# Patient Record
Sex: FEMALE | Race: WHITE | NOT HISPANIC OR LATINO | Employment: FULL TIME | URBAN - METROPOLITAN AREA
[De-identification: names, ages, dates, MRNs, and addresses within clinical notes are randomized per-mention and may not be internally consistent; named-entity substitution may affect disease eponyms.]

---

## 2017-01-30 ENCOUNTER — LAB (OUTPATIENT)
Dept: LAB | Facility: HOSPITAL | Age: 57
End: 2017-01-30

## 2017-01-30 ENCOUNTER — TRANSCRIBE ORDERS (OUTPATIENT)
Dept: ADMINISTRATIVE | Facility: HOSPITAL | Age: 57
End: 2017-01-30

## 2017-01-30 DIAGNOSIS — E11.9 DIABETES MELLITUS WITHOUT COMPLICATION (HCC): ICD-10-CM

## 2017-01-30 DIAGNOSIS — E55.9 UNSPECIFIED VITAMIN D DEFICIENCY: ICD-10-CM

## 2017-01-30 DIAGNOSIS — E03.9 UNSPECIFIED HYPOTHYROIDISM: ICD-10-CM

## 2017-01-30 DIAGNOSIS — I10 ESSENTIAL HYPERTENSION, MALIGNANT: Primary | ICD-10-CM

## 2017-01-30 DIAGNOSIS — I10 ESSENTIAL HYPERTENSION, MALIGNANT: ICD-10-CM

## 2017-01-30 DIAGNOSIS — E78.00 PURE HYPERCHOLESTEROLEMIA: ICD-10-CM

## 2017-01-30 LAB
25(OH)D3 SERPL-MCNC: 40.3 NG/ML (ref 30–100)
ALBUMIN SERPL-MCNC: 4.4 G/DL (ref 3.5–5.2)
ALBUMIN/GLOB SERPL: 1.8 G/DL
ALP SERPL-CCNC: 55 U/L (ref 39–117)
ALT SERPL W P-5'-P-CCNC: 25 U/L (ref 1–33)
ANION GAP SERPL CALCULATED.3IONS-SCNC: 13.9 MMOL/L
AST SERPL-CCNC: 23 U/L (ref 1–32)
BILIRUB SERPL-MCNC: 0.6 MG/DL (ref 0.1–1.2)
BUN BLD-MCNC: 11 MG/DL (ref 6–20)
BUN/CREAT SERPL: 14.1 (ref 7–25)
CALCIUM SPEC-SCNC: 9.4 MG/DL (ref 8.6–10.5)
CHLORIDE SERPL-SCNC: 98 MMOL/L (ref 98–107)
CHOLEST SERPL-MCNC: 128 MG/DL (ref 0–200)
CO2 SERPL-SCNC: 28.1 MMOL/L (ref 22–29)
CREAT BLD-MCNC: 0.78 MG/DL (ref 0.57–1)
GFR SERPL CREATININE-BSD FRML MDRD: 76 ML/MIN/1.73
GLOBULIN UR ELPH-MCNC: 2.4 GM/DL
GLUCOSE BLD-MCNC: 115 MG/DL (ref 65–99)
HBA1C MFR BLD: 5.65 % (ref 4.8–5.6)
HDLC SERPL-MCNC: 57 MG/DL (ref 40–60)
LDLC SERPL CALC-MCNC: 54 MG/DL (ref 0–100)
LDLC/HDLC SERPL: 0.94 {RATIO}
POTASSIUM BLD-SCNC: 4.3 MMOL/L (ref 3.5–5.2)
PROT SERPL-MCNC: 6.8 G/DL (ref 6–8.5)
SODIUM BLD-SCNC: 140 MMOL/L (ref 136–145)
T4 FREE SERPL-MCNC: 1.01 NG/DL (ref 0.93–1.7)
T4 SERPL-MCNC: 7.43 MCG/DL (ref 4.5–11.7)
TRIGL SERPL-MCNC: 87 MG/DL (ref 0–150)
TSH SERPL DL<=0.05 MIU/L-ACNC: 2.17 MIU/ML (ref 0.27–4.2)
VLDLC SERPL-MCNC: 17.4 MG/DL (ref 5–40)

## 2017-01-30 PROCEDURE — 80061 LIPID PANEL: CPT | Performed by: INTERNAL MEDICINE

## 2017-01-30 PROCEDURE — 84439 ASSAY OF FREE THYROXINE: CPT | Performed by: INTERNAL MEDICINE

## 2017-01-30 PROCEDURE — 84443 ASSAY THYROID STIM HORMONE: CPT | Performed by: INTERNAL MEDICINE

## 2017-01-30 PROCEDURE — 80053 COMPREHEN METABOLIC PANEL: CPT | Performed by: INTERNAL MEDICINE

## 2017-01-30 PROCEDURE — 84436 ASSAY OF TOTAL THYROXINE: CPT | Performed by: INTERNAL MEDICINE

## 2017-01-30 PROCEDURE — 83036 HEMOGLOBIN GLYCOSYLATED A1C: CPT | Performed by: INTERNAL MEDICINE

## 2017-01-30 PROCEDURE — 36415 COLL VENOUS BLD VENIPUNCTURE: CPT

## 2017-01-30 PROCEDURE — 82306 VITAMIN D 25 HYDROXY: CPT | Performed by: INTERNAL MEDICINE

## 2017-06-26 ENCOUNTER — OFFICE VISIT (OUTPATIENT)
Dept: OBSTETRICS AND GYNECOLOGY | Age: 57
End: 2017-06-26

## 2017-06-26 VITALS
DIASTOLIC BLOOD PRESSURE: 78 MMHG | BODY MASS INDEX: 33.04 KG/M2 | WEIGHT: 175 LBS | SYSTOLIC BLOOD PRESSURE: 128 MMHG | HEIGHT: 61 IN

## 2017-06-26 DIAGNOSIS — Z78.0 MENOPAUSE: ICD-10-CM

## 2017-06-26 DIAGNOSIS — Z01.419 WELL WOMAN EXAM WITH ROUTINE GYNECOLOGICAL EXAM: Primary | ICD-10-CM

## 2017-06-26 LAB
BILIRUB BLD-MCNC: NEGATIVE MG/DL
GLUCOSE UR STRIP-MCNC: NEGATIVE MG/DL
KETONES UR QL: NEGATIVE
LEUKOCYTE EST, POC: NEGATIVE
NITRITE UR-MCNC: NEGATIVE MG/ML
PH UR: 6 [PH] (ref 5–8)
PROT UR STRIP-MCNC: NEGATIVE MG/DL
RBC # UR STRIP: NEGATIVE /UL
SP GR UR: 1.03 (ref 1–1.03)
UROBILINOGEN UR QL: NORMAL

## 2017-06-26 PROCEDURE — 81003 URINALYSIS AUTO W/O SCOPE: CPT | Performed by: OBSTETRICS & GYNECOLOGY

## 2017-06-26 PROCEDURE — 99396 PREV VISIT EST AGE 40-64: CPT | Performed by: OBSTETRICS & GYNECOLOGY

## 2017-06-26 RX ORDER — FLUOXETINE HCL 10 MG
CAPSULE ORAL
Refills: 3 | COMMUNITY
Start: 2017-03-23 | End: 2017-06-26 | Stop reason: DRUGHIGH

## 2017-06-26 RX ORDER — FLUOXETINE HYDROCHLORIDE 20 MG/1
CAPSULE ORAL DAILY
COMMUNITY
Start: 2017-06-17 | End: 2018-07-02 | Stop reason: DRUGHIGH

## 2017-06-26 NOTE — PROGRESS NOTES
ANNUAL GYN EXAM        2017    Subjective     Abigail Hines is a 57 y.o., , White Female.    Chief Complaint   Patient presents with   • Gynecologic Exam     Abigail is here for her Annual Exam and Mammogram. No gyn complaints.  Her last colonoscopy was two years ago, she repeats every five years due to hx of colon polyps.  She sees Dr. Hogan.        History of Present Illness:     Gyn Complaints:   None     1.Menstrual Hx:  Postmenopausal, No HRT, she tried estring but did not like    2.Pap Smear Hx: Has never had an abnormal Pap smear.  2016, negative Pap smear, negative HR-HPV.    3.Breast / Ovarian Hx:  Regular SBE.       Family history of breast cancer: Mother, Maternal Aunt, Paternal Aunt     Family history of ovarian cancer: None    4.Family Hx of Colon Ca: None, Abigail has history of colon polyps, her last colonoscopy two years ago, ,, normal and repeat in five years.  Dr. Hogan      Family Hx of Uterine Ca: None    5. New Past Medical History: None    Past Medical History:   Diagnosis Date   • Breast nodule     Had calcium deposits on her first MG   • Diabetes mellitus     Type 2 DM   • Hyperlipidemia     Never had high cholesterol, started simvastatin because of Diabetes.   • Kidney stones     Intermittantly for the last 10 years, but none for last couple of years.   • Menopausal state    • Retinal detachment     Twice, 2008 & .        6. New Past Surgical History: None   Past Surgical History:   Procedure Laterality Date   •  SECTION      CPD, Macrosomic.   • CHOLECYSTECTOMY      Non functional GB.   • EXPLORATORY LAPAROTOMY     • TUBAL ABDOMINAL LIGATION          7. New Family Medical History: None   Family History   Problem Relation Age of Onset   • Breast cancer Mother 55     X2 ( 2 SEPARATE CANCERS).   • Dementia Father      RAPID ONSET AND VERY PROGRESSIVE.   • No Known Problems Sister    • No Known Problems Brother    • Anxiety disorder Son   "  • Osteoporosis Maternal Grandmother    • Breast cancer Paternal Grandmother 90       OF OLD AGE   • Osteoporosis Maternal Aunt    • Breast cancer Maternal Aunt 55   • No Known Problems Paternal Aunt    • Heart disease Maternal Grandfather    • Lung cancer Paternal Grandfather    • BRCA 1/2 Neg Hx    • Colon cancer Neg Hx    • Endometrial cancer Neg Hx    • Ovarian cancer Neg Hx          8.   OB History    Para Term  AB SAB TAB Ectopic Multiple Living   1 1 1       1      # Outcome Date GA Lbr Chilo/2nd Weight Sex Delivery Anes PTL Lv   1 Term                    9.   Health Maintenance   Topic Date Due   • PNEUMOCOCCAL VACCINE (19-64 MEDIUM RISK) (1 of 1 - PPSV23) 1977   • TDAP/TD VACCINES (1 - Tdap) 1977   • HEPATITIS C SCREENING  2017   • MAMMOGRAM  2017   • COLONOSCOPY  2017   • PAP SMEAR  2017   • INFLUENZA VACCINE  2017   • LIPID PANEL  2018       The following portions of the patient's history were reviewed / updated as appropriate: allergies, current medications, past medical history, past surgical history, past family history, past social history, and problem list.    Review of Systems   Constitutional: Negative.    HENT: Negative.    Eyes: Positive for itching.        Dry eyes   Respiratory: Negative.    Gastrointestinal: Diarrhea: SINSE CHANGED rx.   Endocrine: Negative.    Genitourinary: Negative.    Musculoskeletal: Negative.    Skin: Negative.    Allergic/Immunologic: Negative.    Neurological: Negative.    Hematological: Negative.    Psychiatric/Behavioral: Negative.    All other systems reviewed and are negative.      Objective     /78  Ht 61\" (154.9 cm)  Wt 175 lb (79.4 kg)  BMI 33.07 kg/m2    PHYSICAL EXAM    Constitutional: Well-developed, well-nourished,Obese white female, in no distress, alert and well oriented ×3.    Skin is warm, dry, without rash.       Neck appears normal, trachea in the midline.  Thyroid exam normal.  " No carotid bruits bilaterally.         No cervical lymphadenopathy.    Lungs clear to auscultation.  Chest wall appears normal.    Heart with regular rhythm without murmur or gallop.    Breasts:         Right breast nontender, without dominant mass.  No nipple discharge.            No superficial skin changes.  No axillary adenopathy.        Left breast nontender, without dominant mass.  No nipple discharge.            No superficial skin changes.  No axillary adenopathy.      Abdomen is flat, soft and nontender.No palpable mass.           No hepatosplenomegaly.  Flanks are negative.          Bowel sounds normal.  No abdominal bruit.          No inguinal lymphadenopathy bilaterally.     Pelvic exam :  Vulva normal.           External genitalia normal.  BUS negative.             Introitus normal.  Vaginal mucosa atrophic            Cervix normal, atrophic, no Pap smear, no cervical motion tenderness.          Uterus midplane, normal size, normal shape, and position.          Adnexa are negative bilaterally.  No tenderness.  No palpable mass.          Rectovaginal exam deferred by patient request.    Musc /Skel: Lower extremities without edema.     Neuro: Coordination is grossly normal.  Gait is normal.    Psych: Mood and affect is normal.  Behavior normal.  Thought content normal.            Judgment normal.        Assessment/Plan   Abigail was seen today for gynecologic exam.    Diagnoses and all orders for this visit:    Well woman exam with routine gynecological exam  -     POC Urinalysis Dipstick, Automated    No HRT  Comments:  No HRT      COMMENTS: Had colon polyps, in 2015.  Repeat every 5 years.    Agustín Jacob MD

## 2017-07-31 ENCOUNTER — LAB (OUTPATIENT)
Dept: LAB | Facility: HOSPITAL | Age: 57
End: 2017-07-31

## 2017-07-31 ENCOUNTER — TRANSCRIBE ORDERS (OUTPATIENT)
Dept: ADMINISTRATIVE | Facility: HOSPITAL | Age: 57
End: 2017-07-31

## 2017-07-31 DIAGNOSIS — E11.9 DIABETES MELLITUS WITHOUT COMPLICATION (HCC): ICD-10-CM

## 2017-07-31 DIAGNOSIS — E03.9 UNSPECIFIED HYPOTHYROIDISM: ICD-10-CM

## 2017-07-31 DIAGNOSIS — I10 ESSENTIAL HYPERTENSION, MALIGNANT: ICD-10-CM

## 2017-07-31 DIAGNOSIS — E78.5 HYPERLIPIDEMIA, UNSPECIFIED HYPERLIPIDEMIA TYPE: ICD-10-CM

## 2017-07-31 DIAGNOSIS — I10 ESSENTIAL HYPERTENSION, MALIGNANT: Primary | ICD-10-CM

## 2017-07-31 LAB
ALBUMIN SERPL-MCNC: 4.3 G/DL (ref 3.5–5.2)
ALBUMIN/GLOB SERPL: 1.7 G/DL
ALP SERPL-CCNC: 52 U/L (ref 39–117)
ALT SERPL W P-5'-P-CCNC: 36 U/L (ref 1–33)
ANION GAP SERPL CALCULATED.3IONS-SCNC: 11.1 MMOL/L
AST SERPL-CCNC: 28 U/L (ref 1–32)
BILIRUB SERPL-MCNC: 0.7 MG/DL (ref 0.1–1.2)
BUN BLD-MCNC: 11 MG/DL (ref 6–20)
BUN/CREAT SERPL: 12.9 (ref 7–25)
CALCIUM SPEC-SCNC: 9.9 MG/DL (ref 8.6–10.5)
CHLORIDE SERPL-SCNC: 101 MMOL/L (ref 98–107)
CHOLEST SERPL-MCNC: 126 MG/DL (ref 0–200)
CO2 SERPL-SCNC: 27.9 MMOL/L (ref 22–29)
CREAT BLD-MCNC: 0.85 MG/DL (ref 0.57–1)
GFR SERPL CREATININE-BSD FRML MDRD: 68 ML/MIN/1.73
GLOBULIN UR ELPH-MCNC: 2.6 GM/DL
GLUCOSE BLD-MCNC: 124 MG/DL (ref 65–99)
HBA1C MFR BLD: 6.3 % (ref 4.8–5.6)
HDLC SERPL-MCNC: 51 MG/DL (ref 40–60)
LDLC SERPL CALC-MCNC: 54 MG/DL (ref 0–100)
LDLC/HDLC SERPL: 1.07 {RATIO}
POTASSIUM BLD-SCNC: 4.4 MMOL/L (ref 3.5–5.2)
PROT SERPL-MCNC: 6.9 G/DL (ref 6–8.5)
SODIUM BLD-SCNC: 140 MMOL/L (ref 136–145)
TRIGL SERPL-MCNC: 103 MG/DL (ref 0–150)
TSH SERPL DL<=0.05 MIU/L-ACNC: 2.84 MIU/ML (ref 0.27–4.2)
VLDLC SERPL-MCNC: 20.6 MG/DL (ref 5–40)

## 2017-07-31 PROCEDURE — 80061 LIPID PANEL: CPT | Performed by: INTERNAL MEDICINE

## 2017-07-31 PROCEDURE — 36415 COLL VENOUS BLD VENIPUNCTURE: CPT

## 2017-07-31 PROCEDURE — 83036 HEMOGLOBIN GLYCOSYLATED A1C: CPT | Performed by: INTERNAL MEDICINE

## 2017-07-31 PROCEDURE — 80053 COMPREHEN METABOLIC PANEL: CPT | Performed by: INTERNAL MEDICINE

## 2017-07-31 PROCEDURE — 84443 ASSAY THYROID STIM HORMONE: CPT | Performed by: INTERNAL MEDICINE

## 2017-08-31 ENCOUNTER — HOSPITAL ENCOUNTER (OUTPATIENT)
Dept: GENERAL RADIOLOGY | Facility: HOSPITAL | Age: 57
Discharge: HOME | End: 2017-08-31
Admitting: INTERNAL MEDICINE

## 2017-08-31 ENCOUNTER — TRANSCRIBE ORDERS (OUTPATIENT)
Dept: ADMINISTRATIVE | Facility: HOSPITAL | Age: 57
End: 2017-08-31

## 2017-08-31 DIAGNOSIS — M79.672 LEFT FOOT PAIN: Primary | ICD-10-CM

## 2017-08-31 DIAGNOSIS — M79.672 LEFT FOOT PAIN: ICD-10-CM

## 2017-08-31 PROCEDURE — 73630 X-RAY EXAM OF FOOT: CPT

## 2017-09-01 ENCOUNTER — TRANSCRIBE ORDERS (OUTPATIENT)
Dept: ADMINISTRATIVE | Facility: HOSPITAL | Age: 57
End: 2017-09-01

## 2017-09-01 DIAGNOSIS — M84.30XA STRESS FRACTURE, UNSPECIFIED SITE, INITIAL ENCOUNTER: Primary | ICD-10-CM

## 2017-09-06 ENCOUNTER — APPOINTMENT (OUTPATIENT)
Dept: NUCLEAR MEDICINE | Facility: HOSPITAL | Age: 57
End: 2017-09-06

## 2017-12-04 ENCOUNTER — TRANSCRIBE ORDERS (OUTPATIENT)
Dept: ADMINISTRATIVE | Facility: HOSPITAL | Age: 57
End: 2017-12-04

## 2017-12-04 ENCOUNTER — LAB (OUTPATIENT)
Dept: LAB | Facility: HOSPITAL | Age: 57
End: 2017-12-04

## 2017-12-04 DIAGNOSIS — E11.9 DIABETES MELLITUS WITHOUT COMPLICATION (HCC): ICD-10-CM

## 2017-12-04 DIAGNOSIS — I10 ESSENTIAL HYPERTENSION, MALIGNANT: Primary | ICD-10-CM

## 2017-12-04 DIAGNOSIS — E78.5 HYPERLIPIDEMIA, UNSPECIFIED HYPERLIPIDEMIA TYPE: ICD-10-CM

## 2017-12-04 DIAGNOSIS — I10 ESSENTIAL HYPERTENSION, MALIGNANT: ICD-10-CM

## 2017-12-04 LAB
ALBUMIN SERPL-MCNC: 4.2 G/DL (ref 3.5–5.2)
ALBUMIN/GLOB SERPL: 1.7 G/DL
ALP SERPL-CCNC: 63 U/L (ref 39–117)
ALT SERPL W P-5'-P-CCNC: 36 U/L (ref 1–33)
ANION GAP SERPL CALCULATED.3IONS-SCNC: 11.4 MMOL/L
AST SERPL-CCNC: 26 U/L (ref 1–32)
BILIRUB SERPL-MCNC: 0.7 MG/DL (ref 0.1–1.2)
BUN BLD-MCNC: 10 MG/DL (ref 6–20)
BUN/CREAT SERPL: 12.3 (ref 7–25)
CALCIUM SPEC-SCNC: 9.9 MG/DL (ref 8.6–10.5)
CHLORIDE SERPL-SCNC: 99 MMOL/L (ref 98–107)
CHOLEST SERPL-MCNC: 125 MG/DL (ref 0–200)
CO2 SERPL-SCNC: 27.6 MMOL/L (ref 22–29)
CREAT BLD-MCNC: 0.81 MG/DL (ref 0.57–1)
GFR SERPL CREATININE-BSD FRML MDRD: 72 ML/MIN/1.73
GLOBULIN UR ELPH-MCNC: 2.5 GM/DL
GLUCOSE BLD-MCNC: 128 MG/DL (ref 65–99)
HBA1C MFR BLD: 6.7 % (ref 4.8–5.6)
HDLC SERPL-MCNC: 51 MG/DL (ref 40–60)
LDLC SERPL CALC-MCNC: 53 MG/DL (ref 0–100)
LDLC/HDLC SERPL: 1.04 {RATIO}
POTASSIUM BLD-SCNC: 4.2 MMOL/L (ref 3.5–5.2)
PROT SERPL-MCNC: 6.7 G/DL (ref 6–8.5)
SODIUM BLD-SCNC: 138 MMOL/L (ref 136–145)
TRIGL SERPL-MCNC: 106 MG/DL (ref 0–150)
VLDLC SERPL-MCNC: 21.2 MG/DL (ref 5–40)

## 2017-12-04 PROCEDURE — 80061 LIPID PANEL: CPT | Performed by: INTERNAL MEDICINE

## 2017-12-04 PROCEDURE — 80053 COMPREHEN METABOLIC PANEL: CPT | Performed by: INTERNAL MEDICINE

## 2017-12-04 PROCEDURE — 36415 COLL VENOUS BLD VENIPUNCTURE: CPT

## 2017-12-04 PROCEDURE — 83036 HEMOGLOBIN GLYCOSYLATED A1C: CPT | Performed by: INTERNAL MEDICINE

## 2017-12-05 ENCOUNTER — TRANSCRIBE ORDERS (OUTPATIENT)
Dept: ADMINISTRATIVE | Facility: HOSPITAL | Age: 57
End: 2017-12-05

## 2017-12-05 DIAGNOSIS — S92.902S FOOT FRACTURE, LEFT, SEQUELA: Primary | ICD-10-CM

## 2017-12-07 ENCOUNTER — HOSPITAL ENCOUNTER (OUTPATIENT)
Dept: NUCLEAR MEDICINE | Facility: HOSPITAL | Age: 57
Discharge: HOME | End: 2017-12-07

## 2017-12-07 DIAGNOSIS — S92.902S FOOT FRACTURE, LEFT, SEQUELA: ICD-10-CM

## 2017-12-07 PROCEDURE — A9503 TC99M MEDRONATE: HCPCS

## 2017-12-07 PROCEDURE — 0 TECHNETIUM MEDRONATE KIT

## 2017-12-07 PROCEDURE — 78300 BONE IMAGING LIMITED AREA: CPT

## 2017-12-07 RX ORDER — TC 99M MEDRONATE 20 MG/10ML
21.3 INJECTION, POWDER, LYOPHILIZED, FOR SOLUTION INTRAVENOUS
Status: DISCONTINUED | OUTPATIENT
Start: 2017-12-07 | End: 2017-12-08 | Stop reason: HOSPADM

## 2018-04-09 ENCOUNTER — LAB (OUTPATIENT)
Dept: LAB | Facility: HOSPITAL | Age: 58
End: 2018-04-09

## 2018-04-09 ENCOUNTER — TRANSCRIBE ORDERS (OUTPATIENT)
Dept: ADMINISTRATIVE | Facility: HOSPITAL | Age: 58
End: 2018-04-09

## 2018-04-09 DIAGNOSIS — E78.5 HYPERLIPIDEMIA, UNSPECIFIED HYPERLIPIDEMIA TYPE: ICD-10-CM

## 2018-04-09 DIAGNOSIS — I10 ESSENTIAL HYPERTENSION, MALIGNANT: Primary | ICD-10-CM

## 2018-04-09 DIAGNOSIS — I51.9 MYXEDEMA HEART DISEASE: ICD-10-CM

## 2018-04-09 DIAGNOSIS — E03.9 MYXEDEMA HEART DISEASE: ICD-10-CM

## 2018-04-09 DIAGNOSIS — E11.9 DIABETES MELLITUS WITHOUT COMPLICATION (HCC): ICD-10-CM

## 2018-04-09 DIAGNOSIS — I10 ESSENTIAL HYPERTENSION, MALIGNANT: ICD-10-CM

## 2018-04-09 LAB
ALBUMIN SERPL-MCNC: 4.3 G/DL (ref 3.5–5.2)
ALBUMIN/GLOB SERPL: 1.6 G/DL
ALP SERPL-CCNC: 56 U/L (ref 39–117)
ALT SERPL W P-5'-P-CCNC: 30 U/L (ref 1–33)
ANION GAP SERPL CALCULATED.3IONS-SCNC: 12.2 MMOL/L
AST SERPL-CCNC: 25 U/L (ref 1–32)
BILIRUB SERPL-MCNC: 0.7 MG/DL (ref 0.1–1.2)
BUN BLD-MCNC: 12 MG/DL (ref 8–23)
BUN/CREAT SERPL: 14.1 (ref 7–25)
CALCIUM SPEC-SCNC: 9.9 MG/DL (ref 8.6–10.5)
CHLORIDE SERPL-SCNC: 96 MMOL/L (ref 98–107)
CHOLEST SERPL-MCNC: 142 MG/DL (ref 0–200)
CO2 SERPL-SCNC: 28.8 MMOL/L (ref 22–29)
CREAT BLD-MCNC: 0.85 MG/DL (ref 0.57–1)
GFR SERPL CREATININE-BSD FRML MDRD: 68 ML/MIN/1.73
GLOBULIN UR ELPH-MCNC: 2.7 GM/DL
GLUCOSE BLD-MCNC: 121 MG/DL (ref 65–99)
HBA1C MFR BLD: 6.3 % (ref 4.8–5.6)
HDLC SERPL-MCNC: 49 MG/DL (ref 40–60)
LDLC SERPL CALC-MCNC: 71 MG/DL (ref 0–100)
LDLC/HDLC SERPL: 1.46 {RATIO}
POTASSIUM BLD-SCNC: 4.1 MMOL/L (ref 3.5–5.2)
PROT SERPL-MCNC: 7 G/DL (ref 6–8.5)
SODIUM BLD-SCNC: 137 MMOL/L (ref 136–145)
TRIGL SERPL-MCNC: 108 MG/DL (ref 0–150)
TSH SERPL DL<=0.05 MIU/L-ACNC: 2.31 MIU/ML (ref 0.27–4.2)
VLDLC SERPL-MCNC: 21.6 MG/DL (ref 5–40)

## 2018-04-09 PROCEDURE — 80053 COMPREHEN METABOLIC PANEL: CPT | Performed by: INTERNAL MEDICINE

## 2018-04-09 PROCEDURE — 84443 ASSAY THYROID STIM HORMONE: CPT | Performed by: INTERNAL MEDICINE

## 2018-04-09 PROCEDURE — 80061 LIPID PANEL: CPT | Performed by: INTERNAL MEDICINE

## 2018-04-09 PROCEDURE — 83036 HEMOGLOBIN GLYCOSYLATED A1C: CPT | Performed by: INTERNAL MEDICINE

## 2018-04-09 PROCEDURE — 36415 COLL VENOUS BLD VENIPUNCTURE: CPT

## 2018-07-02 ENCOUNTER — OFFICE VISIT (OUTPATIENT)
Dept: OBSTETRICS AND GYNECOLOGY | Age: 58
End: 2018-07-02

## 2018-07-02 ENCOUNTER — APPOINTMENT (OUTPATIENT)
Dept: WOMENS IMAGING | Facility: HOSPITAL | Age: 58
End: 2018-07-02

## 2018-07-02 VITALS
WEIGHT: 178 LBS | SYSTOLIC BLOOD PRESSURE: 138 MMHG | DIASTOLIC BLOOD PRESSURE: 64 MMHG | HEIGHT: 61 IN | BODY MASS INDEX: 33.61 KG/M2

## 2018-07-02 DIAGNOSIS — Z78.0 MENOPAUSE: ICD-10-CM

## 2018-07-02 DIAGNOSIS — Z11.51 SCREENING FOR HUMAN PAPILLOMAVIRUS: ICD-10-CM

## 2018-07-02 DIAGNOSIS — Z12.31 VISIT FOR SCREENING MAMMOGRAM: ICD-10-CM

## 2018-07-02 DIAGNOSIS — Z01.419 WELL WOMAN EXAM WITH ROUTINE GYNECOLOGICAL EXAM: Primary | ICD-10-CM

## 2018-07-02 LAB
BILIRUB BLD-MCNC: NEGATIVE MG/DL
CLARITY, POC: CLEAR
COLOR UR: YELLOW
GLUCOSE UR STRIP-MCNC: NEGATIVE MG/DL
KETONES UR QL: NEGATIVE
LEUKOCYTE EST, POC: NEGATIVE
NITRITE UR-MCNC: NEGATIVE MG/ML
PH UR: 6 [PH] (ref 5–8)
PROT UR STRIP-MCNC: NEGATIVE MG/DL
RBC # UR STRIP: ABNORMAL /UL
SP GR UR: 1.01 (ref 1–1.03)
UROBILINOGEN UR QL: NORMAL

## 2018-07-02 PROCEDURE — 77067 SCR MAMMO BI INCL CAD: CPT | Performed by: RADIOLOGY

## 2018-07-02 PROCEDURE — 81002 URINALYSIS NONAUTO W/O SCOPE: CPT | Performed by: OBSTETRICS & GYNECOLOGY

## 2018-07-02 PROCEDURE — 99396 PREV VISIT EST AGE 40-64: CPT | Performed by: OBSTETRICS & GYNECOLOGY

## 2018-07-02 RX ORDER — PIOGLITAZONEHYDROCHLORIDE 45 MG/1
45 TABLET ORAL DAILY
COMMUNITY
Start: 2018-05-20 | End: 2020-08-17

## 2018-07-02 RX ORDER — METFORMIN HYDROCHLORIDE 500 MG/1
500 TABLET, EXTENDED RELEASE ORAL 2 TIMES DAILY
COMMUNITY
Start: 2018-05-20 | End: 2020-08-17

## 2018-07-02 NOTE — PROGRESS NOTES
Routine Annual Visit    2018    Patient: Abigail Hines          MR#:[id number]    Chief Complaint   Patient presents with   • Gynecologic Exam     ABIGAIL IS HERE FOR HER AE AND MG.   LAST PAP SMEAR , NEG, NEG HR-HPV. LAST COLONOSCOPY , HX OF COLON POLYPS. COLONOSCOPY DUE THIS YEAR.        58 y.o. female  who presents for annual exam.     HISTORY OF PRESENT ILLNESS:    GYN Complaints:  Tried new lubricant and experienced increased vaginal discharge, no odor.  No itching or irritation.     No bladder problems.     Has lost some weight.    Other Complaints: None.    GYN HISTORY:  1.  Menstrual Hx:  PM      HRT:  no         Current contraception: post menopausal status    2.  History of abnormal Pap smear: no.        Pap smear Hx:  [id number], normal yearly Paps.                                    , neg pap smear, neg HR-HPV.     NEW PAST MEDICAL HISTORY:    3.  New Medical Hx:   Started new dose Metformin and increase in Pioglitazone Tabs; BS well controlled, HgbA1c = 6.3. Working on weight loss.  Past Medical History:   Diagnosis Date   • Breast nodule     Had calcium deposits on her first MG   • Diabetes mellitus     Type 2 DM   • Hyperlipidemia     Never had high cholesterol, started simvastatin because of Diabetes.   • Kidney stones     Intermittantly for the last 10 years, but none for last couple of years.   • Menopausal state    • Retinal detachment     Twice,  & .           4.  New Surgical Hx:  None.  Past Surgical History:   Procedure Laterality Date   •  SECTION      CPD, Macrosomic.   • CHOLECYSTECTOMY      Non functional GB.   • COLONOSCOPY      Dr. Hogan  normal repeat in five years (hx of colon polyps).  Verify dates   • EXPLORATORY LAPAROTOMY     • TUBAL ABDOMINAL LIGATION             5.  New Family Medical Hx:  None.  Family History   Problem Relation Age of Onset   • Breast cancer Mother 55        X2 ( 2 SEPARATE CANCERS).   •  "Dementia Father         RAPID ONSET AND VERY PROGRESSIVE.   • No Known Problems Sister    • No Known Problems Brother    • Anxiety disorder Son    • Osteoporosis Maternal Grandmother    • Breast cancer Paternal Grandmother 90          OF OLD AGE   • Osteoporosis Maternal Aunt    • Breast cancer Maternal Aunt 55   • Breast cancer Paternal Aunt    • Heart disease Maternal Grandfather    • Lung cancer Paternal Grandfather    • BRCA 1/2 Neg Hx    • Colon cancer Neg Hx    • Endometrial cancer Neg Hx    • Ovarian cancer Neg Hx          6.  SCREENINGS:  =Family history of breast cancer: yes - Mother, Maternal Aunt, Paternal Aunt  Perform regular self breast exam: yes - most months  Breast self-examination technique  reviewed and the patient encouraged to perform self breast exams monthly.     =Family history of ovarian cancer: no  =Family history of uterine cancer: no  =Family History of colon cancer: no, personal hx of colon polyps. Colonoscopy q 5 yr, Dr Hogan.      Mammogram: done today.  Colonoscopy: Due this year.  , normal repeat in five years (Dr. Hogan) hx of colon polyps.   DEXA: up to date. , Very dense at last check. Lumbar T score= +1.5, right femur, -0.2, left femur +0.1.    7.  LIFESTYLE:  =Diet: Working on weight loss..   = We discussed healthy lifestyle modifications.      =Exercise:  yes - two times per week.   =I recommended 30 minutes of aerobic exercise 5 times a week.     =Calcium  no.  =Vitamin D:  yes - 2000 IU / day..   = We discussed calcium intake needs to help prevent osteoporosis:      Recommended 600 mg of calcium daily and 1000 IUs of vitamin D 3 daily.      Review of Systems   Genitourinary: Positive for vaginal discharge.   All other systems reviewed and are negative.      Objective     /64   Ht 154.9 cm (61\")   Wt 80.7 kg (178 lb)   Breastfeeding? No   BMI 33.63 kg/m²     PHYSICAL EXAM    Constitutional: Well-developed, well-nourished, Obese  female, in no " distress, alert and well oriented ×3.    Skin is warm, dry, without rash.       Neck appears normal, trachea in the midline.  Thyroid exam normal.  No carotid bruits bilaterally.         No cervical lymphadenopathy.    Lungs clear to auscultation.  Chest wall appears normal.    Heart with regular rhythm without murmur or gallop.    Breasts:         Right breast nontender, without dominant mass.  No nipple discharge.            No superficial skin changes.  No axillary adenopathy.        Left breast nontender, without dominant mass.  No nipple discharge.            No superficial skin changes.  No axillary adenopathy.      Abdomen is flat, soft and nontender.No palpable mass.           No hepatosplenomegaly.  Flanks are negative.          Bowel sounds normal.  No abdominal bruit.          No inguinal lymphadenopathy bilaterally.     Pelvic exam :  Vulva normal.           External genitalia normal.  BUS negative.  No erythema or lesions.           Introitus normal.  Vaginal mucosa normal.  Normal-appearing discharge.          Cervix normal, very small os, Pap with HPV.  No cervical motion tenderness.          Uterus plain, normal size, normal shape, and position.          Adnexa are negative bilaterally.  No tenderness.  No palpable mass.          Rectovaginal exam negative .  Stool heme negative.    Musc /Skel: Lower extremities without edema.     Neuro: Coordination is grossly normal.  Gait is normal.    Psych: Mood and affect is normal.  Behavior normal.  Thought content normal.            Judgment normal.       =All questions were answered.      Assessment/Plan   Abigail was seen today for gynecologic exam.    Diagnoses and all orders for this visit:    Well woman exam with routine gynecological exam  -     POC Urinalysis Dipstick  -     PapIG, HPV, Rfx 16 / 18    Menopause  Comments:  No HRT.  Still sexually active, no problems.    Screening for human papillomavirus  -     PapIG, HPV, Rfx 16 /  18        COMMENTS:    Agustín Jacob MD  7/2/2018

## 2018-07-04 LAB
CYTOLOGIST CVX/VAG CYTO: NORMAL
CYTOLOGY CVX/VAG DOC THIN PREP: NORMAL
DX ICD CODE: NORMAL
HIV 1 & 2 AB SER-IMP: NORMAL
HPV I/H RISK 1 DNA CVX QL PROBE+SIG AMP: NEGATIVE
OTHER STN SPEC: NORMAL
PATH REPORT.FINAL DX SPEC: NORMAL
STAT OF ADQ CVX/VAG CYTO-IMP: NORMAL

## 2018-09-17 ENCOUNTER — LAB (OUTPATIENT)
Dept: LAB | Facility: HOSPITAL | Age: 58
End: 2018-09-17

## 2018-09-17 ENCOUNTER — TRANSCRIBE ORDERS (OUTPATIENT)
Dept: ADMINISTRATIVE | Facility: HOSPITAL | Age: 58
End: 2018-09-17

## 2018-09-17 DIAGNOSIS — E78.5 HYPERLIPIDEMIA, UNSPECIFIED HYPERLIPIDEMIA TYPE: ICD-10-CM

## 2018-09-17 DIAGNOSIS — E55.9 AVITAMINOSIS D: ICD-10-CM

## 2018-09-17 DIAGNOSIS — E11.9 DIABETES MELLITUS WITHOUT COMPLICATION (HCC): ICD-10-CM

## 2018-09-17 DIAGNOSIS — Z00.00 ROUTINE GENERAL MEDICAL EXAMINATION AT A HEALTH CARE FACILITY: ICD-10-CM

## 2018-09-17 DIAGNOSIS — I10 ESSENTIAL HYPERTENSION, MALIGNANT: ICD-10-CM

## 2018-09-17 DIAGNOSIS — Z00.00 ROUTINE GENERAL MEDICAL EXAMINATION AT A HEALTH CARE FACILITY: Primary | ICD-10-CM

## 2018-09-17 LAB
25(OH)D3 SERPL-MCNC: 73.2 NG/ML (ref 30–100)
ALBUMIN SERPL-MCNC: 4 G/DL (ref 3.5–5.2)
ALBUMIN/GLOB SERPL: 1.5 G/DL
ALP SERPL-CCNC: 55 U/L (ref 39–117)
ALT SERPL W P-5'-P-CCNC: 30 U/L (ref 1–33)
ANION GAP SERPL CALCULATED.3IONS-SCNC: 11 MMOL/L
AST SERPL-CCNC: 19 U/L (ref 1–32)
BASOPHILS # BLD AUTO: 0.05 10*3/MM3 (ref 0–0.2)
BASOPHILS NFR BLD AUTO: 0.9 % (ref 0–1.5)
BILIRUB SERPL-MCNC: 0.5 MG/DL (ref 0.1–1.2)
BILIRUB UR QL STRIP: NEGATIVE
BUN BLD-MCNC: 9 MG/DL (ref 8–23)
BUN/CREAT SERPL: 11.7 (ref 7–25)
CALCIUM SPEC-SCNC: 9.6 MG/DL (ref 8.6–10.5)
CHLORIDE SERPL-SCNC: 99 MMOL/L (ref 98–107)
CHOLEST SERPL-MCNC: 131 MG/DL (ref 0–200)
CLARITY UR: CLEAR
CO2 SERPL-SCNC: 28 MMOL/L (ref 22–29)
COLOR UR: YELLOW
CREAT BLD-MCNC: 0.77 MG/DL (ref 0.57–1)
DEPRECATED RDW RBC AUTO: 45.5 FL (ref 37–54)
EOSINOPHIL # BLD AUTO: 0.34 10*3/MM3 (ref 0–0.7)
EOSINOPHIL NFR BLD AUTO: 5.9 % (ref 0.3–6.2)
ERYTHROCYTE [DISTWIDTH] IN BLOOD BY AUTOMATED COUNT: 13 % (ref 11.7–13)
GFR SERPL CREATININE-BSD FRML MDRD: 76 ML/MIN/1.73
GLOBULIN UR ELPH-MCNC: 2.6 GM/DL
GLUCOSE BLD-MCNC: 108 MG/DL (ref 65–99)
GLUCOSE UR STRIP-MCNC: NEGATIVE MG/DL
HBA1C MFR BLD: 6.3 % (ref 4.8–5.6)
HCT VFR BLD AUTO: 39.8 % (ref 35.6–45.5)
HDLC SERPL-MCNC: 50 MG/DL (ref 40–60)
HGB BLD-MCNC: 13.1 G/DL (ref 11.9–15.5)
HGB UR QL STRIP.AUTO: NEGATIVE
IMM GRANULOCYTES # BLD: 0.01 10*3/MM3 (ref 0–0.03)
IMM GRANULOCYTES NFR BLD: 0.2 % (ref 0–0.5)
KETONES UR QL STRIP: NEGATIVE
LDLC SERPL CALC-MCNC: 53 MG/DL (ref 0–100)
LDLC/HDLC SERPL: 1.05 {RATIO}
LEUKOCYTE ESTERASE UR QL STRIP.AUTO: NEGATIVE
LYMPHOCYTES # BLD AUTO: 1.8 10*3/MM3 (ref 0.9–4.8)
LYMPHOCYTES NFR BLD AUTO: 31.3 % (ref 19.6–45.3)
MCH RBC QN AUTO: 31.8 PG (ref 26.9–32)
MCHC RBC AUTO-ENTMCNC: 32.9 G/DL (ref 32.4–36.3)
MCV RBC AUTO: 96.6 FL (ref 80.5–98.2)
MONOCYTES # BLD AUTO: 0.48 10*3/MM3 (ref 0.2–1.2)
MONOCYTES NFR BLD AUTO: 8.3 % (ref 5–12)
NEUTROPHILS # BLD AUTO: 3.08 10*3/MM3 (ref 1.9–8.1)
NEUTROPHILS NFR BLD AUTO: 53.6 % (ref 42.7–76)
NITRITE UR QL STRIP: NEGATIVE
PH UR STRIP.AUTO: 6.5 [PH] (ref 5–8)
PLATELET # BLD AUTO: 402 10*3/MM3 (ref 140–500)
PMV BLD AUTO: 10.2 FL (ref 6–12)
POTASSIUM BLD-SCNC: 4.2 MMOL/L (ref 3.5–5.2)
PROT SERPL-MCNC: 6.6 G/DL (ref 6–8.5)
PROT UR QL STRIP: NEGATIVE
RBC # BLD AUTO: 4.12 10*6/MM3 (ref 3.9–5.2)
SODIUM BLD-SCNC: 138 MMOL/L (ref 136–145)
SP GR UR STRIP: 1.01 (ref 1–1.03)
T3 SERPL-MCNC: 151.1 NG/DL (ref 80–200)
T4 FREE SERPL-MCNC: 1.2 NG/DL (ref 0.93–1.7)
TRIGL SERPL-MCNC: 142 MG/DL (ref 0–150)
TSH SERPL DL<=0.05 MIU/L-ACNC: 3.42 MIU/ML (ref 0.27–4.2)
UROBILINOGEN UR QL STRIP: NORMAL
VLDLC SERPL-MCNC: 28.4 MG/DL (ref 5–40)
WBC NRBC COR # BLD: 5.75 10*3/MM3 (ref 4.5–10.7)

## 2018-09-17 PROCEDURE — 82306 VITAMIN D 25 HYDROXY: CPT | Performed by: INTERNAL MEDICINE

## 2018-09-17 PROCEDURE — 84439 ASSAY OF FREE THYROXINE: CPT | Performed by: INTERNAL MEDICINE

## 2018-09-17 PROCEDURE — 81003 URINALYSIS AUTO W/O SCOPE: CPT | Performed by: INTERNAL MEDICINE

## 2018-09-17 PROCEDURE — 84480 ASSAY TRIIODOTHYRONINE (T3): CPT | Performed by: INTERNAL MEDICINE

## 2018-09-17 PROCEDURE — 85025 COMPLETE CBC W/AUTO DIFF WBC: CPT | Performed by: INTERNAL MEDICINE

## 2018-09-17 PROCEDURE — 80053 COMPREHEN METABOLIC PANEL: CPT | Performed by: INTERNAL MEDICINE

## 2018-09-17 PROCEDURE — 83036 HEMOGLOBIN GLYCOSYLATED A1C: CPT | Performed by: INTERNAL MEDICINE

## 2018-09-17 PROCEDURE — 80061 LIPID PANEL: CPT | Performed by: INTERNAL MEDICINE

## 2018-09-17 PROCEDURE — 36415 COLL VENOUS BLD VENIPUNCTURE: CPT

## 2018-09-17 PROCEDURE — 84443 ASSAY THYROID STIM HORMONE: CPT | Performed by: INTERNAL MEDICINE

## 2018-10-30 ENCOUNTER — APPOINTMENT (OUTPATIENT)
Dept: GENERAL RADIOLOGY | Facility: HOSPITAL | Age: 58
End: 2018-10-30

## 2018-10-30 ENCOUNTER — HOSPITAL ENCOUNTER (OUTPATIENT)
Dept: CARDIOLOGY | Facility: HOSPITAL | Age: 58
Discharge: HOME | End: 2018-10-30
Admitting: INTERNAL MEDICINE

## 2018-10-30 ENCOUNTER — HOSPITAL ENCOUNTER (OUTPATIENT)
Dept: CARDIOLOGY | Facility: HOSPITAL | Age: 58
Discharge: HOME | End: 2018-10-30
Attending: INTERNAL MEDICINE

## 2018-10-30 ENCOUNTER — HOSPITAL ENCOUNTER (EMERGENCY)
Facility: HOSPITAL | Age: 58
Discharge: HOME | End: 2018-10-30
Attending: EMERGENCY MEDICINE | Admitting: EMERGENCY MEDICINE

## 2018-10-30 VITALS
SYSTOLIC BLOOD PRESSURE: 130 MMHG | RESPIRATION RATE: 16 BRPM | DIASTOLIC BLOOD PRESSURE: 78 MMHG | OXYGEN SATURATION: 98 % | HEART RATE: 80 BPM

## 2018-10-30 VITALS
RESPIRATION RATE: 18 BRPM | SYSTOLIC BLOOD PRESSURE: 122 MMHG | WEIGHT: 185.5 LBS | HEIGHT: 61 IN | DIASTOLIC BLOOD PRESSURE: 67 MMHG | BODY MASS INDEX: 35.02 KG/M2 | TEMPERATURE: 98.9 F | HEART RATE: 63 BPM | OXYGEN SATURATION: 95 %

## 2018-10-30 DIAGNOSIS — R07.2 PRECORDIAL PAIN: ICD-10-CM

## 2018-10-30 DIAGNOSIS — R07.2 PRECORDIAL PAIN: Primary | ICD-10-CM

## 2018-10-30 DIAGNOSIS — R07.9 CHEST PAIN, UNSPECIFIED TYPE: Primary | ICD-10-CM

## 2018-10-30 LAB
ALBUMIN SERPL-MCNC: 4.3 G/DL (ref 3.5–5.2)
ALBUMIN/GLOB SERPL: 1.7 G/DL
ALP SERPL-CCNC: 60 U/L (ref 39–117)
ALT SERPL W P-5'-P-CCNC: 41 U/L (ref 1–33)
ANION GAP SERPL CALCULATED.3IONS-SCNC: 12 MMOL/L
AST SERPL-CCNC: 35 U/L (ref 1–32)
BASOPHILS # BLD AUTO: 0.04 10*3/MM3 (ref 0–0.2)
BASOPHILS NFR BLD AUTO: 0.4 % (ref 0–1.5)
BH CV NUCLEAR PRIOR STUDY: 2
BH CV STRESS BP STAGE 1: NORMAL
BH CV STRESS BP STAGE 2: NORMAL
BH CV STRESS DURATION MIN STAGE 1: 3
BH CV STRESS DURATION MIN STAGE 2: 3
BH CV STRESS DURATION SEC STAGE 1: 0
BH CV STRESS DURATION SEC STAGE 2: 0
BH CV STRESS DURATION SEC STAGE 3: 30
BH CV STRESS GRADE STAGE 1: 10
BH CV STRESS GRADE STAGE 2: 12
BH CV STRESS GRADE STAGE 3: 14
BH CV STRESS HR STAGE 1: 111
BH CV STRESS HR STAGE 2: 137
BH CV STRESS HR STAGE 3: 143
BH CV STRESS METS STAGE 1: 5
BH CV STRESS METS STAGE 2: 7.5
BH CV STRESS METS STAGE 3: 10
BH CV STRESS PROTOCOL 1: NORMAL
BH CV STRESS RECOVERY BP: NORMAL MMHG
BH CV STRESS RECOVERY HR: 93 BPM
BH CV STRESS SPEED STAGE 1: 1.7
BH CV STRESS SPEED STAGE 2: 2.5
BH CV STRESS SPEED STAGE 3: 3.4
BH CV STRESS STAGE 1: 1
BH CV STRESS STAGE 2: 2
BH CV STRESS STAGE 3: 3
BILIRUB SERPL-MCNC: 0.6 MG/DL (ref 0.1–1.2)
BUN BLD-MCNC: 7 MG/DL (ref 8–23)
BUN/CREAT SERPL: 8.4 (ref 7–25)
CALCIUM SPEC-SCNC: 9.8 MG/DL (ref 8.6–10.5)
CHLORIDE SERPL-SCNC: 97 MMOL/L (ref 98–107)
CO2 SERPL-SCNC: 28 MMOL/L (ref 22–29)
CREAT BLD-MCNC: 0.83 MG/DL (ref 0.57–1)
DEPRECATED RDW RBC AUTO: 43.5 FL (ref 37–54)
EOSINOPHIL # BLD AUTO: 0.12 10*3/MM3 (ref 0–0.7)
EOSINOPHIL NFR BLD AUTO: 1.2 % (ref 0.3–6.2)
ERYTHROCYTE [DISTWIDTH] IN BLOOD BY AUTOMATED COUNT: 12.6 % (ref 11.7–13)
GFR SERPL CREATININE-BSD FRML MDRD: 70 ML/MIN/1.73
GLOBULIN UR ELPH-MCNC: 2.5 GM/DL
GLUCOSE BLD-MCNC: 186 MG/DL (ref 65–99)
HCT VFR BLD AUTO: 42.7 % (ref 35.6–45.5)
HGB BLD-MCNC: 13.6 G/DL (ref 11.9–15.5)
IMM GRANULOCYTES # BLD: 0 10*3/MM3 (ref 0–0.03)
IMM GRANULOCYTES NFR BLD: 0 % (ref 0–0.5)
LV EF NUC BP: 79 %
LYMPHOCYTES # BLD AUTO: 1.25 10*3/MM3 (ref 0.9–4.8)
LYMPHOCYTES NFR BLD AUTO: 12 % (ref 19.6–45.3)
MAXIMAL PREDICTED HEART RATE: 160 BPM
MCH RBC QN AUTO: 30.3 PG (ref 26.9–32)
MCHC RBC AUTO-ENTMCNC: 31.9 G/DL (ref 32.4–36.3)
MCV RBC AUTO: 95.1 FL (ref 80.5–98.2)
MONOCYTES # BLD AUTO: 0.68 10*3/MM3 (ref 0.2–1.2)
MONOCYTES NFR BLD AUTO: 6.6 % (ref 5–12)
NEUTROPHILS # BLD AUTO: 8.29 10*3/MM3 (ref 1.9–8.1)
NEUTROPHILS NFR BLD AUTO: 79.8 % (ref 42.7–76)
NT-PROBNP SERPL-MCNC: 44.8 PG/ML (ref 5–900)
PERCENT MAX PREDICTED HR: 89.38 %
PLATELET # BLD AUTO: 393 10*3/MM3 (ref 140–500)
PMV BLD AUTO: 10.4 FL (ref 6–12)
POTASSIUM BLD-SCNC: 4 MMOL/L (ref 3.5–5.2)
PROT SERPL-MCNC: 6.8 G/DL (ref 6–8.5)
RBC # BLD AUTO: 4.49 10*6/MM3 (ref 3.9–5.2)
SODIUM BLD-SCNC: 137 MMOL/L (ref 136–145)
STRESS BASELINE BP: NORMAL MMHG
STRESS BASELINE HR: 85 BPM
STRESS PERCENT HR: 105 %
STRESS POST ESTIMATED WORKLOAD: 7 METS
STRESS POST EXERCISE DUR MIN: 6 MIN
STRESS POST EXERCISE DUR SEC: 30 SEC
STRESS POST PEAK BP: NORMAL MMHG
STRESS POST PEAK HR: 143 BPM
STRESS TARGET HR: 136 BPM
TROPONIN T SERPL-MCNC: <0.01 NG/ML (ref 0–0.03)
WBC NRBC COR # BLD: 10.38 10*3/MM3 (ref 4.5–10.7)

## 2018-10-30 PROCEDURE — 93018 CV STRESS TEST I&R ONLY: CPT | Performed by: INTERNAL MEDICINE

## 2018-10-30 PROCEDURE — 93010 ELECTROCARDIOGRAM REPORT: CPT | Performed by: INTERNAL MEDICINE

## 2018-10-30 PROCEDURE — 99203 OFFICE O/P NEW LOW 30 MIN: CPT | Performed by: INTERNAL MEDICINE

## 2018-10-30 PROCEDURE — 85025 COMPLETE CBC W/AUTO DIFF WBC: CPT | Performed by: EMERGENCY MEDICINE

## 2018-10-30 PROCEDURE — 0 TECHNETIUM TETROFOSMIN KIT: Performed by: INTERNAL MEDICINE

## 2018-10-30 PROCEDURE — 84484 ASSAY OF TROPONIN QUANT: CPT | Performed by: EMERGENCY MEDICINE

## 2018-10-30 PROCEDURE — 71046 X-RAY EXAM CHEST 2 VIEWS: CPT

## 2018-10-30 PROCEDURE — 78452 HT MUSCLE IMAGE SPECT MULT: CPT | Performed by: INTERNAL MEDICINE

## 2018-10-30 PROCEDURE — 99284 EMERGENCY DEPT VISIT MOD MDM: CPT

## 2018-10-30 PROCEDURE — 93017 CV STRESS TEST TRACING ONLY: CPT

## 2018-10-30 PROCEDURE — 93005 ELECTROCARDIOGRAM TRACING: CPT | Performed by: EMERGENCY MEDICINE

## 2018-10-30 PROCEDURE — 83880 ASSAY OF NATRIURETIC PEPTIDE: CPT | Performed by: EMERGENCY MEDICINE

## 2018-10-30 PROCEDURE — 94760 N-INVAS EAR/PLS OXIMETRY 1: CPT

## 2018-10-30 PROCEDURE — 80053 COMPREHEN METABOLIC PANEL: CPT | Performed by: EMERGENCY MEDICINE

## 2018-10-30 PROCEDURE — A9502 TC99M TETROFOSMIN: HCPCS | Performed by: INTERNAL MEDICINE

## 2018-10-30 PROCEDURE — 93016 CV STRESS TEST SUPVJ ONLY: CPT | Performed by: INTERNAL MEDICINE

## 2018-10-30 PROCEDURE — 78452 HT MUSCLE IMAGE SPECT MULT: CPT

## 2018-10-30 RX ORDER — FLUOXETINE HYDROCHLORIDE 20 MG/1
20 CAPSULE ORAL DAILY
COMMUNITY
End: 2019-08-05

## 2018-10-30 RX ADMIN — TETROFOSMIN 1 DOSE: 1.38 INJECTION, POWDER, LYOPHILIZED, FOR SOLUTION INTRAVENOUS at 14:20

## 2018-10-30 RX ADMIN — TETROFOSMIN 1 DOSE: 1.38 INJECTION, POWDER, LYOPHILIZED, FOR SOLUTION INTRAVENOUS at 15:20

## 2018-10-30 NOTE — PROGRESS NOTES
Subjective:     Encounter Date:10/30/2018      Patient ID: Abigail Hines is a 58 y.o. female.    Chief Complaint: CP  History of Present Illness    This patient presents to the [facility] from the ED after presenting with CP.    She woke up and had an epigastric and lower substernal discomfort.  This was about 5:30 or 545 when she woke up.  She did have some nausea.  She had recently been started on Prozac and thought that might be the explanation.  She laid there for a little while, but didn't feel any better.  She did not have a acid or bitterness in her mouth.  No diaphoresis.  She got up and moved around and that did not seem to affect the discomfort.  She then thought she should get this checked out so she called EMS.  EMS arrived and gave her sublingual nitroglycerin which may be made it a little bit better.  She was then brought to the emergency room at [facility].  By the time of her arrival she was pain-free.  Evaluation in the emergency room was unremarkable.  She had no further spells of chest pain or chest discomfort.  Troponin levels, other laboratory data, and EKG were unremarkable.  Telemetry monitoring did not demonstrate any pathology.  Chest x-ray did not show any abnormality.  They then contacted Dr. Conner and the patient was sent to the [facility] for evaluation and a stress test.    Prior to this she's been doing well without any cardiac symptoms.  No chest pain or chest discomfort.  No shortness breath.  No nausea or vomiting, and no diaphoresis.  She's been active without difficulty.This patient has no known cardiac history.  This patient has no history of coronary artery disease, congestive heart failure, rheumatic fever, rheumatic heart disease, congenital heart disease or heart murmur.  This patient has never required invasive cardiovascular evaluation.        The following portions of the patient's history were reviewed and updated as appropriate: allergies, current  medications, past family history, past medical history, past social history, past surgical history and problem list.    Past Medical History:   Diagnosis Date   • Breast nodule     Had calcium deposits on her first MG   • Diabetes mellitus (CMS/[facility])     Type 2 DM   • History of Papanicolaou smear of cervix      Never had an abnormal Pap smear.   Pap smear Hx:  [id number], normal yearly Paps.  , neg pap smear, neg HR-HPV.    • Hyperlipidemia     Never had high cholesterol, started simvastatin because of Diabetes.   • Hypertension    • Kidney stones     Intermittantly for the last 10 years, but none for last couple of years.   • Menopausal state    • Retinal detachment 2010    Twice, 2008 & 2010.       Past Surgical History:   Procedure Laterality Date   • CATARACT EXTRACTION, BILATERAL     •  SECTION      CPD, Macrosomic.   • CHOLECYSTECTOMY      Non functional GB.   • COLONOSCOPY      Dr. Hogan  normal repeat in five years (hx of colon polyps).  Verify dates   • EXPLORATORY LAPAROTOMY     • TUBAL ABDOMINAL LIGATION         Social History     Social History   • Marital status:      Spouse name: N/A   • Number of children: 1   • Years of education: N/A     Occupational History   • Not on file.     Social History Main Topics   • Smoking status: Never Smoker   • Smokeless tobacco: Never Used   • Alcohol use No   • Drug use: No   • Sexual activity: Yes     Partners: Male     Birth control/ protection: Surgical, Post-menopausal      Comment: Partner - JAYLEN      Other Topics Concern   • Not on file     Social History Narrative   • No narrative on file       Review of Systems   Constitution: Negative for chills, decreased appetite, fever and night sweats.   HENT: Negative for ear discharge, ear pain, hearing loss, nosebleeds and sore throat.    Eyes: Negative for blurred vision, double vision and pain.   Cardiovascular: Negative for cyanosis.   Respiratory: Negative for  hemoptysis and sputum production.    Endocrine: Negative for cold intolerance and heat intolerance.   Hematologic/Lymphatic: Negative for adenopathy.   Skin: Negative for dry skin, itching, nail changes, rash and suspicious lesions.   Musculoskeletal: Negative for arthritis, gout, muscle cramps, muscle weakness, myalgias and neck pain.   Gastrointestinal: Positive for abdominal pain. Negative for anorexia, bowel incontinence, constipation, diarrhea, dysphagia, hematemesis and jaundice.   Genitourinary: Negative for bladder incontinence, dysuria, flank pain, frequency, hematuria and nocturia.   Neurological: Negative for focal weakness, numbness, paresthesias and seizures.   Psychiatric/Behavioral: Negative for altered mental status, hallucinations, hypervigilance, suicidal ideas and thoughts of violence.   Allergic/Immunologic: Negative for persistent infections.       Procedures       Objective:     Vitals:    10/30/18 1336   BP: 130/78   BP Location: Left arm   Patient Position: Sitting   Pulse: 80   Resp: 16   SpO2: 98%         Physical Exam   Constitutional: She is oriented to person, place, and time. She appears well-developed and well-nourished. No distress.   HENT:   Head: Normocephalic and atraumatic.   Nose: Nose normal.   Mouth/Throat: Oropharynx is clear and moist.   Eyes: Pupils are equal, round, and reactive to light. Conjunctivae and EOM are normal. Right eye exhibits no discharge. Left eye exhibits no discharge.   Neck: Normal range of motion. Neck supple. No tracheal deviation present. No thyromegaly present.   Cardiovascular: Normal rate, regular rhythm, S1 normal, S2 normal, normal heart sounds and normal pulses.  Exam reveals no S3.    Pulmonary/Chest: Effort normal and breath sounds normal. No stridor. No respiratory distress. She exhibits no tenderness.   Abdominal: Soft. Bowel sounds are normal. She exhibits no distension and no mass. There is no tenderness. There is no rebound and no guarding.    Musculoskeletal: Normal range of motion. She exhibits no tenderness or deformity.   Lymphadenopathy:     She has no cervical adenopathy.   Neurological: She is alert and oriented to person, place, and time. She has normal reflexes.   Skin: Skin is warm and dry. No rash noted. She is not diaphoretic. No erythema.   Psychiatric: She has a normal mood and affect. Thought content normal.       Lab Review:     Results from last 7 days  Lab Units 10/30/18  0902   SODIUM mmol/L 137   POTASSIUM mmol/L 4.0   CHLORIDE mmol/L 97*   CO2 mmol/L 28.0   BUN mg/dL 7*   CREATININE mg/dL 0.83   GLUCOSE mg/dL 186*   CALCIUM mg/dL 9.8           Performed        Assessment:          Diagnosis Plan   1. Precordial pain  Stress Test With Myocardial Perfusion One Day          Plan:       1. CP- both typical and atypical components, will arrange for a stress CL.  ADDENDUM: Stress Cardiolite was performed and no abnormality was seen.  The post test probability of CAD is very low.  Patient is to follow-up with her primary care physician for further investigation of her chest discomfort that appears to be a noncardiac chest pain.    Thank you very much for allowing us to participate in the care of this pleasant patient.  Please don't hesitate to call if I can be of assistance in any way.      Current Outpatient Prescriptions:   •  Cholecalciferol (VITAMIN D) 1000 UNITS tablet, Take 1,000 Units by mouth., Disp: , Rfl:   •  FLUoxetine (PROzac) 20 MG capsule, Take 20 mg by mouth Daily., Disp: , Rfl:   •  metFORMIN ER (GLUCOPHAGE-XR) 500 MG 24 hr tablet, Take 500 mg by mouth 2 (Two) Times a Day., Disp: , Rfl:   •  pioglitazone (ACTOS) 45 MG tablet, Take 45 mg by mouth Daily., Disp: , Rfl:   •  simvastatin (ZOCOR) 20 MG tablet, Take 20 mg by mouth every night., Disp: , Rfl:   •  spironolactone (ALDACTONE) 50 MG tablet, Take 50 mg by mouth daily., Disp: , Rfl:   No current facility-administered medications for this encounter.

## 2018-12-21 ENCOUNTER — TRANSCRIBE ORDERS (OUTPATIENT)
Dept: ADMINISTRATIVE | Facility: HOSPITAL | Age: 58
End: 2018-12-21

## 2018-12-21 ENCOUNTER — LAB (OUTPATIENT)
Dept: LAB | Facility: HOSPITAL | Age: 58
End: 2018-12-21

## 2018-12-21 DIAGNOSIS — E78.5 HYPERLIPIDEMIA, UNSPECIFIED HYPERLIPIDEMIA TYPE: ICD-10-CM

## 2018-12-21 DIAGNOSIS — E11.9 DIABETES MELLITUS WITHOUT COMPLICATION (HCC): ICD-10-CM

## 2018-12-21 DIAGNOSIS — I10 ESSENTIAL HYPERTENSION, MALIGNANT: ICD-10-CM

## 2018-12-21 DIAGNOSIS — I10 ESSENTIAL HYPERTENSION, MALIGNANT: Primary | ICD-10-CM

## 2018-12-21 LAB
ALBUMIN SERPL-MCNC: 4.1 G/DL (ref 3.5–5.2)
ALBUMIN/GLOB SERPL: 1.9 G/DL
ALP SERPL-CCNC: 65 U/L (ref 39–117)
ALT SERPL W P-5'-P-CCNC: 32 U/L (ref 1–33)
ANION GAP SERPL CALCULATED.3IONS-SCNC: 10.1 MMOL/L
AST SERPL-CCNC: 23 U/L (ref 1–32)
BILIRUB SERPL-MCNC: 0.4 MG/DL (ref 0.1–1.2)
BUN BLD-MCNC: 17 MG/DL (ref 8–23)
BUN/CREAT SERPL: 19.8 (ref 7–25)
CALCIUM SPEC-SCNC: 9.6 MG/DL (ref 8.6–10.5)
CHLORIDE SERPL-SCNC: 96 MMOL/L (ref 98–107)
CHOLEST SERPL-MCNC: 131 MG/DL (ref 0–200)
CO2 SERPL-SCNC: 28.9 MMOL/L (ref 22–29)
CREAT BLD-MCNC: 0.86 MG/DL (ref 0.57–1)
GFR SERPL CREATININE-BSD FRML MDRD: 67 ML/MIN/1.73
GLOBULIN UR ELPH-MCNC: 2.2 GM/DL
GLUCOSE BLD-MCNC: 111 MG/DL (ref 65–99)
HBA1C MFR BLD: 5.9 % (ref 4.8–5.6)
HDLC SERPL-MCNC: 50 MG/DL (ref 40–60)
LDLC SERPL CALC-MCNC: 66 MG/DL (ref 0–100)
LDLC/HDLC SERPL: 1.32 {RATIO}
POTASSIUM BLD-SCNC: 4.4 MMOL/L (ref 3.5–5.2)
PROT SERPL-MCNC: 6.3 G/DL (ref 6–8.5)
SODIUM BLD-SCNC: 135 MMOL/L (ref 136–145)
TRIGL SERPL-MCNC: 74 MG/DL (ref 0–150)
VLDLC SERPL-MCNC: 14.8 MG/DL (ref 5–40)

## 2018-12-21 PROCEDURE — 80053 COMPREHEN METABOLIC PANEL: CPT | Performed by: INTERNAL MEDICINE

## 2018-12-21 PROCEDURE — 83036 HEMOGLOBIN GLYCOSYLATED A1C: CPT | Performed by: INTERNAL MEDICINE

## 2018-12-21 PROCEDURE — 36415 COLL VENOUS BLD VENIPUNCTURE: CPT

## 2018-12-21 PROCEDURE — 80061 LIPID PANEL: CPT | Performed by: INTERNAL MEDICINE

## 2019-04-23 ENCOUNTER — LAB (OUTPATIENT)
Dept: LAB | Facility: HOSPITAL | Age: 59
End: 2019-04-23

## 2019-04-23 ENCOUNTER — TRANSCRIBE ORDERS (OUTPATIENT)
Dept: ADMINISTRATIVE | Facility: HOSPITAL | Age: 59
End: 2019-04-23

## 2019-04-23 DIAGNOSIS — E78.5 HYPERLIPIDEMIA, UNSPECIFIED HYPERLIPIDEMIA TYPE: ICD-10-CM

## 2019-04-23 DIAGNOSIS — E55.9 AVITAMINOSIS D: ICD-10-CM

## 2019-04-23 DIAGNOSIS — Z00.00 ROUTINE GENERAL MEDICAL EXAMINATION AT A HEALTH CARE FACILITY: ICD-10-CM

## 2019-04-23 DIAGNOSIS — Z00.00 ROUTINE GENERAL MEDICAL EXAMINATION AT A HEALTH CARE FACILITY: Primary | ICD-10-CM

## 2019-04-23 DIAGNOSIS — E11.9 DIABETES MELLITUS WITHOUT COMPLICATION (HCC): ICD-10-CM

## 2019-04-23 DIAGNOSIS — I10 ESSENTIAL HYPERTENSION, MALIGNANT: ICD-10-CM

## 2019-04-23 LAB
25(OH)D3 SERPL-MCNC: 56.8 NG/ML (ref 30–100)
ALBUMIN SERPL-MCNC: 3.8 G/DL (ref 3.5–5.2)
ALBUMIN UR-MCNC: <1.2 MG/L
ALBUMIN/GLOB SERPL: 1.4 G/DL
ALP SERPL-CCNC: 56 U/L (ref 39–117)
ALT SERPL W P-5'-P-CCNC: 21 U/L (ref 1–33)
ANION GAP SERPL CALCULATED.3IONS-SCNC: 12.3 MMOL/L
AST SERPL-CCNC: 15 U/L (ref 1–32)
BASOPHILS # BLD AUTO: 0.05 10*3/MM3 (ref 0–0.2)
BASOPHILS NFR BLD AUTO: 0.9 % (ref 0–1.5)
BILIRUB SERPL-MCNC: 0.4 MG/DL (ref 0.2–1.2)
BILIRUB UR QL STRIP: NEGATIVE
BUN BLD-MCNC: 15 MG/DL (ref 8–23)
BUN/CREAT SERPL: 18.1 (ref 7–25)
CALCIUM SPEC-SCNC: 9.7 MG/DL (ref 8.6–10.5)
CHLORIDE SERPL-SCNC: 96 MMOL/L (ref 98–107)
CHOLEST SERPL-MCNC: 121 MG/DL (ref 0–200)
CLARITY UR: CLEAR
CO2 SERPL-SCNC: 26.7 MMOL/L (ref 22–29)
COLOR UR: YELLOW
CREAT BLD-MCNC: 0.83 MG/DL (ref 0.57–1)
DEPRECATED RDW RBC AUTO: 44.3 FL (ref 37–54)
EOSINOPHIL # BLD AUTO: 0.39 10*3/MM3 (ref 0–0.4)
EOSINOPHIL NFR BLD AUTO: 7.1 % (ref 0.3–6.2)
ERYTHROCYTE [DISTWIDTH] IN BLOOD BY AUTOMATED COUNT: 12.2 % (ref 12.3–15.4)
FOLATE SERPL-MCNC: 12.9 NG/ML (ref 4.78–24.2)
GFR SERPL CREATININE-BSD FRML MDRD: 70 ML/MIN/1.73
GLOBULIN UR ELPH-MCNC: 2.8 GM/DL
GLUCOSE BLD-MCNC: 94 MG/DL (ref 65–99)
GLUCOSE UR STRIP-MCNC: NEGATIVE MG/DL
HBA1C MFR BLD: 5.79 % (ref 4.8–5.6)
HCT VFR BLD AUTO: 38.5 % (ref 34–46.6)
HDLC SERPL-MCNC: 42 MG/DL (ref 40–60)
HGB BLD-MCNC: 12.4 G/DL (ref 12–15.9)
HGB UR QL STRIP.AUTO: NEGATIVE
IMM GRANULOCYTES # BLD AUTO: 0.02 10*3/MM3 (ref 0–0.05)
IMM GRANULOCYTES NFR BLD AUTO: 0.4 % (ref 0–0.5)
KETONES UR QL STRIP: NEGATIVE
LDLC SERPL CALC-MCNC: 63 MG/DL (ref 0–100)
LDLC/HDLC SERPL: 1.5 {RATIO}
LEUKOCYTE ESTERASE UR QL STRIP.AUTO: NEGATIVE
LYMPHOCYTES # BLD AUTO: 1.82 10*3/MM3 (ref 0.7–3.1)
LYMPHOCYTES NFR BLD AUTO: 32.9 % (ref 19.6–45.3)
MCH RBC QN AUTO: 31.8 PG (ref 26.6–33)
MCHC RBC AUTO-ENTMCNC: 32.2 G/DL (ref 31.5–35.7)
MCV RBC AUTO: 98.7 FL (ref 79–97)
MONOCYTES # BLD AUTO: 0.45 10*3/MM3 (ref 0.1–0.9)
MONOCYTES NFR BLD AUTO: 8.1 % (ref 5–12)
NEUTROPHILS # BLD AUTO: 2.8 10*3/MM3 (ref 1.7–7)
NEUTROPHILS NFR BLD AUTO: 50.6 % (ref 42.7–76)
NITRITE UR QL STRIP: NEGATIVE
NRBC BLD AUTO-RTO: 0.2 /100 WBC (ref 0–0.2)
PH UR STRIP.AUTO: 5.5 [PH] (ref 5–8)
PLATELET # BLD AUTO: 499 10*3/MM3 (ref 140–450)
PMV BLD AUTO: 10.4 FL (ref 6–12)
POTASSIUM BLD-SCNC: 4.8 MMOL/L (ref 3.5–5.2)
PROT SERPL-MCNC: 6.6 G/DL (ref 6–8.5)
PROT UR QL STRIP: NEGATIVE
RBC # BLD AUTO: 3.9 10*6/MM3 (ref 3.77–5.28)
SODIUM BLD-SCNC: 135 MMOL/L (ref 136–145)
SP GR UR STRIP: 1.02 (ref 1–1.03)
T3 SERPL-MCNC: 122 NG/DL (ref 80–200)
T4 FREE SERPL-MCNC: 1.28 NG/DL (ref 0.93–1.7)
TRIGL SERPL-MCNC: 79 MG/DL (ref 0–150)
TSH SERPL DL<=0.05 MIU/L-ACNC: 2.02 MIU/ML (ref 0.27–4.2)
UROBILINOGEN UR QL STRIP: NORMAL
VIT B12 BLD-MCNC: 819 PG/ML (ref 211–946)
VLDLC SERPL-MCNC: 15.8 MG/DL (ref 5–40)
WBC NRBC COR # BLD: 5.53 10*3/MM3 (ref 3.4–10.8)

## 2019-04-23 PROCEDURE — 36415 COLL VENOUS BLD VENIPUNCTURE: CPT

## 2019-04-23 PROCEDURE — 82746 ASSAY OF FOLIC ACID SERUM: CPT | Performed by: INTERNAL MEDICINE

## 2019-04-23 PROCEDURE — 84480 ASSAY TRIIODOTHYRONINE (T3): CPT | Performed by: INTERNAL MEDICINE

## 2019-04-23 PROCEDURE — 85025 COMPLETE CBC W/AUTO DIFF WBC: CPT | Performed by: INTERNAL MEDICINE

## 2019-04-23 PROCEDURE — 83036 HEMOGLOBIN GLYCOSYLATED A1C: CPT | Performed by: INTERNAL MEDICINE

## 2019-04-23 PROCEDURE — 80061 LIPID PANEL: CPT | Performed by: INTERNAL MEDICINE

## 2019-04-23 PROCEDURE — 82607 VITAMIN B-12: CPT | Performed by: INTERNAL MEDICINE

## 2019-04-23 PROCEDURE — 82306 VITAMIN D 25 HYDROXY: CPT | Performed by: INTERNAL MEDICINE

## 2019-04-23 PROCEDURE — 84439 ASSAY OF FREE THYROXINE: CPT | Performed by: INTERNAL MEDICINE

## 2019-04-23 PROCEDURE — 80053 COMPREHEN METABOLIC PANEL: CPT | Performed by: INTERNAL MEDICINE

## 2019-04-23 PROCEDURE — 82043 UR ALBUMIN QUANTITATIVE: CPT | Performed by: INTERNAL MEDICINE

## 2019-04-23 PROCEDURE — 84443 ASSAY THYROID STIM HORMONE: CPT | Performed by: INTERNAL MEDICINE

## 2019-04-23 PROCEDURE — 81003 URINALYSIS AUTO W/O SCOPE: CPT | Performed by: INTERNAL MEDICINE

## 2019-05-08 ENCOUNTER — TELEPHONE (OUTPATIENT)
Dept: OBSTETRICS AND GYNECOLOGY | Age: 59
End: 2019-05-08

## 2019-08-05 ENCOUNTER — TRANSCRIBE ORDERS (OUTPATIENT)
Dept: ADMINISTRATIVE | Facility: HOSPITAL | Age: 59
End: 2019-08-05

## 2019-08-05 ENCOUNTER — LAB (OUTPATIENT)
Dept: LAB | Facility: HOSPITAL | Age: 59
End: 2019-08-05

## 2019-08-05 ENCOUNTER — OFFICE VISIT (OUTPATIENT)
Dept: OBSTETRICS AND GYNECOLOGY | Facility: CLINIC | Age: 59
End: 2019-08-05

## 2019-08-05 VITALS
WEIGHT: 172 LBS | SYSTOLIC BLOOD PRESSURE: 160 MMHG | RESPIRATION RATE: 16 BRPM | DIASTOLIC BLOOD PRESSURE: 88 MMHG | BODY MASS INDEX: 32.47 KG/M2 | HEIGHT: 61 IN

## 2019-08-05 DIAGNOSIS — E78.5 HYPERLIPIDEMIA, UNSPECIFIED HYPERLIPIDEMIA TYPE: ICD-10-CM

## 2019-08-05 DIAGNOSIS — R63.8 INCREASED BMI: ICD-10-CM

## 2019-08-05 DIAGNOSIS — E11.9 DIABETES MELLITUS WITHOUT COMPLICATION (HCC): ICD-10-CM

## 2019-08-05 DIAGNOSIS — I10 ESSENTIAL HYPERTENSION, MALIGNANT: Primary | ICD-10-CM

## 2019-08-05 DIAGNOSIS — Z12.39 SCREENING FOR BREAST CANCER: ICD-10-CM

## 2019-08-05 DIAGNOSIS — Z13.820 OSTEOPOROSIS SCREENING: Primary | ICD-10-CM

## 2019-08-05 DIAGNOSIS — F41.9 CHRONIC ANXIETY: ICD-10-CM

## 2019-08-05 DIAGNOSIS — Z01.419 WELL WOMAN EXAM: ICD-10-CM

## 2019-08-05 DIAGNOSIS — I10 ESSENTIAL HYPERTENSION, MALIGNANT: ICD-10-CM

## 2019-08-05 LAB
ALBUMIN SERPL-MCNC: 4.3 G/DL (ref 3.5–5.2)
ALBUMIN/GLOB SERPL: 2.3 G/DL
ALP SERPL-CCNC: 51 U/L (ref 39–117)
ALT SERPL W P-5'-P-CCNC: 20 U/L (ref 1–33)
ANION GAP SERPL CALCULATED.3IONS-SCNC: 11.5 MMOL/L (ref 5–15)
AST SERPL-CCNC: 20 U/L (ref 1–32)
BILIRUB SERPL-MCNC: 0.6 MG/DL (ref 0.2–1.2)
BUN BLD-MCNC: 10 MG/DL (ref 8–23)
BUN/CREAT SERPL: 11.5 (ref 7–25)
CALCIUM SPEC-SCNC: 9.7 MG/DL (ref 8.6–10.5)
CHLORIDE SERPL-SCNC: 100 MMOL/L (ref 98–107)
CHOLEST SERPL-MCNC: 122 MG/DL (ref 0–200)
CO2 SERPL-SCNC: 28.5 MMOL/L (ref 22–29)
CREAT BLD-MCNC: 0.87 MG/DL (ref 0.57–1)
GFR SERPL CREATININE-BSD FRML MDRD: 66 ML/MIN/1.73
GLOBULIN UR ELPH-MCNC: 1.9 GM/DL
GLUCOSE BLD-MCNC: 98 MG/DL (ref 65–99)
HBA1C MFR BLD: 6.25 % (ref 4.8–5.6)
HDLC SERPL-MCNC: 51 MG/DL (ref 40–60)
LDLC SERPL CALC-MCNC: 50 MG/DL (ref 0–100)
LDLC/HDLC SERPL: 0.98 {RATIO}
POTASSIUM BLD-SCNC: 4.4 MMOL/L (ref 3.5–5.2)
PROT SERPL-MCNC: 6.2 G/DL (ref 6–8.5)
SODIUM BLD-SCNC: 140 MMOL/L (ref 136–145)
TRIGL SERPL-MCNC: 105 MG/DL (ref 0–150)
VLDLC SERPL-MCNC: 21 MG/DL (ref 5–40)

## 2019-08-05 PROCEDURE — 80053 COMPREHEN METABOLIC PANEL: CPT | Performed by: INTERNAL MEDICINE

## 2019-08-05 PROCEDURE — 83036 HEMOGLOBIN GLYCOSYLATED A1C: CPT | Performed by: INTERNAL MEDICINE

## 2019-08-05 PROCEDURE — 80061 LIPID PANEL: CPT | Performed by: INTERNAL MEDICINE

## 2019-08-05 PROCEDURE — 99386 PREV VISIT NEW AGE 40-64: CPT | Performed by: OBSTETRICS & GYNECOLOGY

## 2019-08-05 PROCEDURE — 36415 COLL VENOUS BLD VENIPUNCTURE: CPT

## 2019-08-05 RX ORDER — LISINOPRIL 5 MG/1
TABLET ORAL
Refills: 3 | COMMUNITY
Start: 2019-05-05 | End: 2020-08-17

## 2019-08-05 NOTE — PROGRESS NOTES
GYN Annual Exam     CC- Here for annual exam.     Pt new to practice? yes  Pt new to me? yes     HPI: History of Present Illness      Abigail Hines is a 59 y.o. female who presents for annual well woman exam. No LMP recorded. Patient is postmenopausal.    MAMMOGRAM UP TO DATE IF AGE APPROPRIATE?  no    COLONOSCOPY UP TO DATE IF AGE APPROPRIATE? yes    Fhx breast cancer? Mother, maternal aunt, paternal aunt    Fhx ovarian cancer? no    Fhx colon cancer? no    Invitae testing offered? yes      PMHX:  Patient Active Problem List   Diagnosis   • Infected sebaceous cyst   • Type 2 diabetes mellitus (CMS/HCC)   • Increased BMI   • Chronic anxiety   ; otherwise none    OB History      Para Term  AB Living    1 1 1     1    SAB TAB Ectopic Molar Multiple Live Births              1        Obstetric Comments    First delivery for Dr. Jacob in private practice.             Past Medical History:   Diagnosis Date   • Breast nodule     Had calcium deposits on her first MG   • Diabetes mellitus (CMS/HCC)     Type 2 DM   • History of Papanicolaou smear of cervix      Never had an abnormal Pap smear.   Pap smear Hx:  8900-1865, normal yearly Paps.  , neg pap smear, neg HR-HPV.    • Hyperlipidemia     Never had high cholesterol, started simvastatin because of Diabetes.   • Hypertension    • Kidney stones     Intermittantly for the last 10 years, but none for last couple of years.   • Menopausal state    • Retinal detachment     Twice, 2008 & .       Past Surgical History:   Procedure Laterality Date   • CATARACT EXTRACTION, BILATERAL     •  SECTION      CPD, Macrosomic.   • CHOLECYSTECTOMY      Non functional GB.   • COLONOSCOPY      Dr. Hogan  normal repeat in five years (hx of colon polyps).  Verify dates   • EXPLORATORY LAPAROTOMY     • TUBAL ABDOMINAL LIGATION           Current Outpatient Medications:   •  Cholecalciferol (VITAMIN D) 1000 UNITS tablet, Take  "5,000 Units by mouth., Disp: , Rfl:   •  lisinopril (PRINIVIL,ZESTRIL) 5 MG tablet, TK 1 T PO QAM, Disp: , Rfl: 3  •  metFORMIN ER (GLUCOPHAGE-XR) 500 MG 24 hr tablet, Take 500 mg by mouth 2 (Two) Times a Day., Disp: , Rfl:   •  pioglitazone (ACTOS) 45 MG tablet, Take 45 mg by mouth Daily., Disp: , Rfl:   •  simvastatin (ZOCOR) 20 MG tablet, Take 20 mg by mouth every night., Disp: , Rfl:   •  spironolactone (ALDACTONE) 50 MG tablet, Take 50 mg by mouth daily., Disp: , Rfl:     Allergies   Allergen Reactions   • Penicillins Rash       Social History     Tobacco Use   • Smoking status: Never Smoker   • Smokeless tobacco: Never Used   Substance Use Topics   • Alcohol use: No   • Drug use: No       Abigail Hines is a current non smoker          Family History   Problem Relation Age of Onset   • Breast cancer Mother 55        X2 ( 2 SEPARATE CANCERS).   • Dementia Father         RAPID ONSET AND VERY PROGRESSIVE.   • No Known Problems Sister    • No Known Problems Brother    • Anxiety disorder Son    • Osteoporosis Maternal Grandmother    • Breast cancer Paternal Grandmother 90          OF OLD AGE   • Osteoporosis Maternal Aunt    • Breast cancer Maternal Aunt 55   • Breast cancer Paternal Aunt    • Heart disease Maternal Grandfather    • Lung cancer Paternal Grandfather    • BRCA 1/2 Neg Hx    • Colon cancer Neg Hx    • Endometrial cancer Neg Hx    • Ovarian cancer Neg Hx        Review of Systems        EXAM:  /88 (BP Location: Left arm, Patient Position: Sitting, Cuff Size: Adult)   Resp 16   Ht 154.9 cm (60.98\")   Wt 78 kg (172 lb)   BMI 32.52 kg/m²     Physical Exam   Constitutional: She is oriented to person, place, and time. She appears well-developed and well-nourished.   HENT:   Head: Normocephalic and atraumatic.   Neck: Normal range of motion. Neck supple. No thyromegaly present.   Cardiovascular: Normal rate and regular rhythm.   Pulmonary/Chest: Effort normal and breath sounds normal. Right " breast exhibits no mass and no nipple discharge. Left breast exhibits no mass and no nipple discharge. Breasts are symmetrical. There is no breast swelling.   Abdominal: Soft. Bowel sounds are normal. She exhibits no distension and no mass. There is no tenderness. There is no rebound and no guarding.   Genitourinary: Vagina normal and uterus normal. No breast tenderness, discharge or bleeding. Pelvic exam was performed with patient prone. There is no lesion on the right labia. There is no lesion on the left labia. Cervix exhibits no motion tenderness and no discharge. Right adnexum displays no mass. Left adnexum displays no mass.   Genitourinary Comments: cx wnl, no pap   Musculoskeletal: Normal range of motion. She exhibits no edema.   Neurological: She is alert and oriented to person, place, and time.   Skin: Skin is warm and dry.   Breasts wnl bilaterally   Psychiatric: She has a normal mood and affect. Her behavior is normal. Judgment and thought content normal.   Nursing note and vitals reviewed.         As part of wellness and prevention, the following topics were discussed with the patient:  []  Nutrition  []  Physical activity/regular exercise   [x]  Healthy weight  []  Injury prevention  [x]  Substance misuse/abuse  []  Sexual behavior  []  STD prevention  []  Contaception  []  Dental health  [x]  Mental health  []  Immunization  [x]  Encouraged SBE     Counseling and guidance done:  Nutrition, physical activity, healthy weight, injury prevention, misuse of tobacco, alcohol and drugs, sexual behavior and STDs, contraception, dental health, mental health, immunizations breast cancer screening and exams.    Assessment     1) GYN annual well woman exam.   2) PAP done today? no  3) problems addressed: none       Plan       Follow up prn and one year.    Diagnoses and all orders for this visit:    Osteoporosis screening  -     DEXA Bone Density Axial; Future    Screening for breast cancer  -     Mammo Screening  Digital Tomosynthesis Bilateral With CAD; Future    Well woman exam    Increased BMI    Chronic anxiety        RTO Return in about 1 year (around 8/5/2020) for Annual physical.        Kirill Bosch MD  [unfilled]  3:02 PM

## 2019-08-26 ENCOUNTER — HOSPITAL ENCOUNTER (OUTPATIENT)
Dept: BONE DENSITY | Facility: HOSPITAL | Age: 59
Discharge: HOME | End: 2019-08-26

## 2019-08-26 ENCOUNTER — HOSPITAL ENCOUNTER (OUTPATIENT)
Dept: MAMMOGRAPHY | Facility: HOSPITAL | Age: 59
Discharge: HOME | End: 2019-08-26
Admitting: OBSTETRICS & GYNECOLOGY

## 2019-08-26 DIAGNOSIS — Z13.820 OSTEOPOROSIS SCREENING: ICD-10-CM

## 2019-08-26 DIAGNOSIS — Z12.39 SCREENING FOR BREAST CANCER: ICD-10-CM

## 2019-08-26 PROCEDURE — 77063 BREAST TOMOSYNTHESIS BI: CPT

## 2019-08-26 PROCEDURE — 77067 SCR MAMMO BI INCL CAD: CPT

## 2019-08-26 PROCEDURE — 77080 DXA BONE DENSITY AXIAL: CPT

## 2019-12-09 ENCOUNTER — TRANSCRIBE ORDERS (OUTPATIENT)
Dept: ADMINISTRATIVE | Facility: HOSPITAL | Age: 59
End: 2019-12-09

## 2019-12-09 ENCOUNTER — LAB (OUTPATIENT)
Dept: LAB | Facility: HOSPITAL | Age: 59
End: 2019-12-09

## 2019-12-09 DIAGNOSIS — Z00.00 ROUTINE GENERAL MEDICAL EXAMINATION AT A HEALTH CARE FACILITY: Primary | ICD-10-CM

## 2019-12-09 DIAGNOSIS — I10 ESSENTIAL HYPERTENSION, MALIGNANT: ICD-10-CM

## 2019-12-09 DIAGNOSIS — E78.5 HYPERLIPIDEMIA, UNSPECIFIED HYPERLIPIDEMIA TYPE: ICD-10-CM

## 2019-12-09 DIAGNOSIS — E11.9 DIABETES MELLITUS WITHOUT COMPLICATION (HCC): ICD-10-CM

## 2019-12-09 DIAGNOSIS — E55.9 AVITAMINOSIS D: ICD-10-CM

## 2019-12-09 DIAGNOSIS — Z00.00 ROUTINE GENERAL MEDICAL EXAMINATION AT A HEALTH CARE FACILITY: ICD-10-CM

## 2019-12-09 LAB
25(OH)D3 SERPL-MCNC: 75.1 NG/ML (ref 30–100)
ALBUMIN SERPL-MCNC: 4.2 G/DL (ref 3.5–5.2)
ALBUMIN UR-MCNC: <1.2 MG/DL
ALBUMIN/GLOB SERPL: 1.8 G/DL
ALP SERPL-CCNC: 49 U/L (ref 39–117)
ALT SERPL W P-5'-P-CCNC: 46 U/L (ref 1–33)
ANION GAP SERPL CALCULATED.3IONS-SCNC: 11.5 MMOL/L (ref 5–15)
AST SERPL-CCNC: 27 U/L (ref 1–32)
BASOPHILS # BLD AUTO: 0.08 10*3/MM3 (ref 0–0.2)
BASOPHILS NFR BLD AUTO: 1.3 % (ref 0–1.5)
BILIRUB SERPL-MCNC: 0.6 MG/DL (ref 0.2–1.2)
BILIRUB UR QL STRIP: NEGATIVE
BUN BLD-MCNC: 11 MG/DL (ref 8–23)
BUN/CREAT SERPL: 14.5 (ref 7–25)
CALCIUM SPEC-SCNC: 9.4 MG/DL (ref 8.6–10.5)
CHLORIDE SERPL-SCNC: 95 MMOL/L (ref 98–107)
CHOLEST SERPL-MCNC: 109 MG/DL (ref 0–200)
CLARITY UR: CLEAR
CO2 SERPL-SCNC: 28.5 MMOL/L (ref 22–29)
COLOR UR: YELLOW
CREAT BLD-MCNC: 0.76 MG/DL (ref 0.57–1)
DEPRECATED RDW RBC AUTO: 39.5 FL (ref 37–54)
EOSINOPHIL # BLD AUTO: 0.32 10*3/MM3 (ref 0–0.4)
EOSINOPHIL NFR BLD AUTO: 5.1 % (ref 0.3–6.2)
ERYTHROCYTE [DISTWIDTH] IN BLOOD BY AUTOMATED COUNT: 11.6 % (ref 12.3–15.4)
FOLATE SERPL-MCNC: >20 NG/ML (ref 4.78–24.2)
GFR SERPL CREATININE-BSD FRML MDRD: 77 ML/MIN/1.73
GLOBULIN UR ELPH-MCNC: 2.3 GM/DL
GLUCOSE BLD-MCNC: 122 MG/DL (ref 65–99)
GLUCOSE UR STRIP-MCNC: NEGATIVE MG/DL
HBA1C MFR BLD: 6.55 % (ref 4.8–5.6)
HCT VFR BLD AUTO: 39.3 % (ref 34–46.6)
HDLC SERPL-MCNC: 38 MG/DL (ref 40–60)
HGB BLD-MCNC: 13.7 G/DL (ref 12–15.9)
HGB UR QL STRIP.AUTO: NEGATIVE
IMM GRANULOCYTES # BLD AUTO: 0.01 10*3/MM3 (ref 0–0.05)
IMM GRANULOCYTES NFR BLD AUTO: 0.2 % (ref 0–0.5)
KETONES UR QL STRIP: ABNORMAL
LDLC SERPL CALC-MCNC: 47 MG/DL (ref 0–100)
LDLC/HDLC SERPL: 1.24 {RATIO}
LEUKOCYTE ESTERASE UR QL STRIP.AUTO: NEGATIVE
LYMPHOCYTES # BLD AUTO: 2.06 10*3/MM3 (ref 0.7–3.1)
LYMPHOCYTES NFR BLD AUTO: 33 % (ref 19.6–45.3)
MCH RBC QN AUTO: 32.6 PG (ref 26.6–33)
MCHC RBC AUTO-ENTMCNC: 34.9 G/DL (ref 31.5–35.7)
MCV RBC AUTO: 93.6 FL (ref 79–97)
MONOCYTES # BLD AUTO: 0.5 10*3/MM3 (ref 0.1–0.9)
MONOCYTES NFR BLD AUTO: 8 % (ref 5–12)
NEUTROPHILS # BLD AUTO: 3.27 10*3/MM3 (ref 1.7–7)
NEUTROPHILS NFR BLD AUTO: 52.4 % (ref 42.7–76)
NITRITE UR QL STRIP: NEGATIVE
NRBC BLD AUTO-RTO: 0 /100 WBC (ref 0–0.2)
PH UR STRIP.AUTO: 6 [PH] (ref 5–8)
PLATELET # BLD AUTO: 443 10*3/MM3 (ref 140–450)
PMV BLD AUTO: 10.2 FL (ref 6–12)
POTASSIUM BLD-SCNC: 4.3 MMOL/L (ref 3.5–5.2)
PROT SERPL-MCNC: 6.5 G/DL (ref 6–8.5)
PROT UR QL STRIP: NEGATIVE
RBC # BLD AUTO: 4.2 10*6/MM3 (ref 3.77–5.28)
SODIUM BLD-SCNC: 135 MMOL/L (ref 136–145)
SP GR UR STRIP: 1.02 (ref 1–1.03)
T3 SERPL-MCNC: 130 NG/DL (ref 80–200)
T4 FREE SERPL-MCNC: 1.04 NG/DL (ref 0.93–1.7)
TRIGL SERPL-MCNC: 120 MG/DL (ref 0–150)
TSH SERPL DL<=0.05 MIU/L-ACNC: 1.85 UIU/ML (ref 0.27–4.2)
UROBILINOGEN UR QL STRIP: ABNORMAL
VIT B12 BLD-MCNC: 703 PG/ML (ref 211–946)
VLDLC SERPL-MCNC: 24 MG/DL (ref 5–40)
WBC NRBC COR # BLD: 6.24 10*3/MM3 (ref 3.4–10.8)

## 2019-12-09 PROCEDURE — 82607 VITAMIN B-12: CPT | Performed by: INTERNAL MEDICINE

## 2019-12-09 PROCEDURE — 82306 VITAMIN D 25 HYDROXY: CPT | Performed by: INTERNAL MEDICINE

## 2019-12-09 PROCEDURE — 84443 ASSAY THYROID STIM HORMONE: CPT | Performed by: INTERNAL MEDICINE

## 2019-12-09 PROCEDURE — 84439 ASSAY OF FREE THYROXINE: CPT | Performed by: INTERNAL MEDICINE

## 2019-12-09 PROCEDURE — 36415 COLL VENOUS BLD VENIPUNCTURE: CPT

## 2019-12-09 PROCEDURE — 80053 COMPREHEN METABOLIC PANEL: CPT | Performed by: INTERNAL MEDICINE

## 2019-12-09 PROCEDURE — 82043 UR ALBUMIN QUANTITATIVE: CPT | Performed by: INTERNAL MEDICINE

## 2019-12-09 PROCEDURE — 83036 HEMOGLOBIN GLYCOSYLATED A1C: CPT | Performed by: INTERNAL MEDICINE

## 2019-12-09 PROCEDURE — 81003 URINALYSIS AUTO W/O SCOPE: CPT | Performed by: INTERNAL MEDICINE

## 2019-12-09 PROCEDURE — 84480 ASSAY TRIIODOTHYRONINE (T3): CPT | Performed by: INTERNAL MEDICINE

## 2019-12-09 PROCEDURE — 80061 LIPID PANEL: CPT | Performed by: INTERNAL MEDICINE

## 2019-12-09 PROCEDURE — 85025 COMPLETE CBC W/AUTO DIFF WBC: CPT | Performed by: INTERNAL MEDICINE

## 2019-12-09 PROCEDURE — 82746 ASSAY OF FOLIC ACID SERUM: CPT | Performed by: INTERNAL MEDICINE

## 2020-04-06 ENCOUNTER — LAB (OUTPATIENT)
Dept: LAB | Facility: HOSPITAL | Age: 60
End: 2020-04-06

## 2020-04-06 ENCOUNTER — TRANSCRIBE ORDERS (OUTPATIENT)
Dept: ADMINISTRATIVE | Facility: HOSPITAL | Age: 60
End: 2020-04-06

## 2020-04-06 DIAGNOSIS — E11.9 DIABETES MELLITUS WITHOUT COMPLICATION (HCC): ICD-10-CM

## 2020-04-06 DIAGNOSIS — E78.5 HYPERLIPIDEMIA, UNSPECIFIED HYPERLIPIDEMIA TYPE: ICD-10-CM

## 2020-04-06 DIAGNOSIS — I10 ESSENTIAL HYPERTENSION, MALIGNANT: ICD-10-CM

## 2020-04-06 DIAGNOSIS — I10 ESSENTIAL HYPERTENSION, MALIGNANT: Primary | ICD-10-CM

## 2020-04-06 LAB
ALBUMIN SERPL-MCNC: 4.3 G/DL (ref 3.5–5.2)
ALBUMIN/GLOB SERPL: 2.7 G/DL
ALP SERPL-CCNC: 44 U/L (ref 39–117)
ALT SERPL W P-5'-P-CCNC: 45 U/L (ref 1–33)
ANION GAP SERPL CALCULATED.3IONS-SCNC: 12.5 MMOL/L (ref 5–15)
AST SERPL-CCNC: 31 U/L (ref 1–32)
BILIRUB SERPL-MCNC: 0.5 MG/DL (ref 0.2–1.2)
BUN BLD-MCNC: 9 MG/DL (ref 8–23)
BUN/CREAT SERPL: 11.7 (ref 7–25)
CALCIUM SPEC-SCNC: 8.8 MG/DL (ref 8.6–10.5)
CHLORIDE SERPL-SCNC: 89 MMOL/L (ref 98–107)
CHOLEST SERPL-MCNC: 104 MG/DL (ref 0–200)
CO2 SERPL-SCNC: 26.5 MMOL/L (ref 22–29)
CREAT BLD-MCNC: 0.77 MG/DL (ref 0.57–1)
GFR SERPL CREATININE-BSD FRML MDRD: 76 ML/MIN/1.73
GLOBULIN UR ELPH-MCNC: 1.6 GM/DL
GLUCOSE BLD-MCNC: 119 MG/DL (ref 65–99)
HBA1C MFR BLD: 6.73 % (ref 4.8–5.6)
HDLC SERPL-MCNC: 38 MG/DL (ref 40–60)
LDLC SERPL CALC-MCNC: 47 MG/DL (ref 0–100)
LDLC/HDLC SERPL: 1.25 {RATIO}
POTASSIUM BLD-SCNC: 4.2 MMOL/L (ref 3.5–5.2)
PROT SERPL-MCNC: 5.9 G/DL (ref 6–8.5)
SODIUM BLD-SCNC: 128 MMOL/L (ref 136–145)
TRIGL SERPL-MCNC: 93 MG/DL (ref 0–150)
VLDLC SERPL-MCNC: 18.6 MG/DL (ref 5–40)

## 2020-04-06 PROCEDURE — 36415 COLL VENOUS BLD VENIPUNCTURE: CPT

## 2020-04-06 PROCEDURE — 80053 COMPREHEN METABOLIC PANEL: CPT | Performed by: INTERNAL MEDICINE

## 2020-04-06 PROCEDURE — 83036 HEMOGLOBIN GLYCOSYLATED A1C: CPT | Performed by: INTERNAL MEDICINE

## 2020-04-06 PROCEDURE — 80061 LIPID PANEL: CPT | Performed by: INTERNAL MEDICINE

## 2020-08-03 ENCOUNTER — LAB (OUTPATIENT)
Dept: LAB | Facility: HOSPITAL | Age: 60
End: 2020-08-03

## 2020-08-03 ENCOUNTER — TRANSCRIBE ORDERS (OUTPATIENT)
Dept: ADMINISTRATIVE | Facility: HOSPITAL | Age: 60
End: 2020-08-03

## 2020-08-03 DIAGNOSIS — E11.9 DIABETES MELLITUS WITHOUT COMPLICATION (HCC): ICD-10-CM

## 2020-08-03 DIAGNOSIS — E78.5 HYPERLIPIDEMIA, UNSPECIFIED HYPERLIPIDEMIA TYPE: ICD-10-CM

## 2020-08-03 DIAGNOSIS — I10 ESSENTIAL HYPERTENSION, MALIGNANT: Primary | ICD-10-CM

## 2020-08-03 DIAGNOSIS — I10 ESSENTIAL HYPERTENSION, MALIGNANT: ICD-10-CM

## 2020-08-03 LAB
ALBUMIN SERPL-MCNC: 4.3 G/DL (ref 3.5–5.2)
ALBUMIN/GLOB SERPL: 1.9 G/DL
ALP SERPL-CCNC: 47 U/L (ref 39–117)
ALT SERPL W P-5'-P-CCNC: 49 U/L (ref 1–33)
ANION GAP SERPL CALCULATED.3IONS-SCNC: 9.8 MMOL/L (ref 5–15)
AST SERPL-CCNC: 33 U/L (ref 1–32)
BILIRUB SERPL-MCNC: 0.7 MG/DL (ref 0–1.2)
BUN SERPL-MCNC: 6 MG/DL (ref 8–23)
BUN/CREAT SERPL: 7.6 (ref 7–25)
CALCIUM SPEC-SCNC: 9.6 MG/DL (ref 8.6–10.5)
CHLORIDE SERPL-SCNC: 97 MMOL/L (ref 98–107)
CHOLEST SERPL-MCNC: 115 MG/DL (ref 0–200)
CO2 SERPL-SCNC: 29.2 MMOL/L (ref 22–29)
CREAT SERPL-MCNC: 0.79 MG/DL (ref 0.57–1)
GFR SERPL CREATININE-BSD FRML MDRD: 74 ML/MIN/1.73
GLOBULIN UR ELPH-MCNC: 2.3 GM/DL
GLUCOSE SERPL-MCNC: 104 MG/DL (ref 65–99)
HBA1C MFR BLD: 5.97 % (ref 4.8–5.6)
HDLC SERPL-MCNC: 41 MG/DL (ref 40–60)
LDLC SERPL CALC-MCNC: 48 MG/DL (ref 0–100)
LDLC/HDLC SERPL: 1.17 {RATIO}
POTASSIUM SERPL-SCNC: 4.1 MMOL/L (ref 3.5–5.2)
PROT SERPL-MCNC: 6.6 G/DL (ref 6–8.5)
SODIUM SERPL-SCNC: 136 MMOL/L (ref 136–145)
TRIGL SERPL-MCNC: 131 MG/DL (ref 0–150)
VLDLC SERPL-MCNC: 26.2 MG/DL (ref 5–40)

## 2020-08-03 PROCEDURE — 83036 HEMOGLOBIN GLYCOSYLATED A1C: CPT | Performed by: INTERNAL MEDICINE

## 2020-08-03 PROCEDURE — 80053 COMPREHEN METABOLIC PANEL: CPT | Performed by: INTERNAL MEDICINE

## 2020-08-03 PROCEDURE — 36415 COLL VENOUS BLD VENIPUNCTURE: CPT

## 2020-08-03 PROCEDURE — 80061 LIPID PANEL: CPT | Performed by: INTERNAL MEDICINE

## 2020-08-10 ENCOUNTER — TRANSCRIBE ORDERS (OUTPATIENT)
Dept: ADMINISTRATIVE | Facility: HOSPITAL | Age: 60
End: 2020-08-10

## 2020-08-10 DIAGNOSIS — R10.9 ABDOMINAL PAIN, UNSPECIFIED ABDOMINAL LOCATION: Primary | ICD-10-CM

## 2020-08-11 ENCOUNTER — HOSPITAL ENCOUNTER (OUTPATIENT)
Dept: ULTRASOUND IMAGING | Facility: HOSPITAL | Age: 60
Discharge: HOME | End: 2020-08-11
Admitting: INTERNAL MEDICINE

## 2020-08-11 DIAGNOSIS — R10.9 ABDOMINAL PAIN, UNSPECIFIED ABDOMINAL LOCATION: ICD-10-CM

## 2020-08-11 PROCEDURE — 76705 ECHO EXAM OF ABDOMEN: CPT

## 2020-08-17 ENCOUNTER — OFFICE VISIT (OUTPATIENT)
Dept: OBSTETRICS AND GYNECOLOGY | Facility: CLINIC | Age: 60
End: 2020-08-17

## 2020-08-17 VITALS
DIASTOLIC BLOOD PRESSURE: 86 MMHG | WEIGHT: 156 LBS | SYSTOLIC BLOOD PRESSURE: 144 MMHG | BODY MASS INDEX: 29.45 KG/M2 | HEIGHT: 61 IN

## 2020-08-17 DIAGNOSIS — I10 WHITE COAT SYNDROME WITH DIAGNOSIS OF HYPERTENSION: ICD-10-CM

## 2020-08-17 DIAGNOSIS — F41.9 CHRONIC ANXIETY: ICD-10-CM

## 2020-08-17 DIAGNOSIS — Z11.51 SCREENING FOR HPV (HUMAN PAPILLOMAVIRUS): ICD-10-CM

## 2020-08-17 DIAGNOSIS — Z13.9 SCREENING FOR UNSPECIFIED CONDITION: ICD-10-CM

## 2020-08-17 DIAGNOSIS — Z01.419 WELL WOMAN EXAM: ICD-10-CM

## 2020-08-17 DIAGNOSIS — Z01.419 PAP SMEAR, LOW-RISK: Primary | ICD-10-CM

## 2020-08-17 DIAGNOSIS — E11.00 TYPE 2 DIABETES MELLITUS WITH HYPEROSMOLARITY WITHOUT COMA, WITHOUT LONG-TERM CURRENT USE OF INSULIN (HCC): ICD-10-CM

## 2020-08-17 DIAGNOSIS — R63.8 INCREASED BMI: ICD-10-CM

## 2020-08-17 LAB
BILIRUB BLD-MCNC: NEGATIVE MG/DL
CLARITY, POC: CLEAR
COLOR UR: YELLOW
GLUCOSE UR STRIP-MCNC: NEGATIVE MG/DL
KETONES UR QL: NEGATIVE
LEUKOCYTE EST, POC: NEGATIVE
NITRITE UR-MCNC: NEGATIVE MG/ML
PH UR: 7 [PH] (ref 5–8)
PROT UR STRIP-MCNC: NEGATIVE MG/DL
RBC # UR STRIP: NEGATIVE /UL
SP GR UR: 1 (ref 1–1.03)
UROBILINOGEN UR QL: NORMAL

## 2020-08-17 PROCEDURE — 99396 PREV VISIT EST AGE 40-64: CPT | Performed by: OBSTETRICS & GYNECOLOGY

## 2020-08-17 PROCEDURE — 81002 URINALYSIS NONAUTO W/O SCOPE: CPT | Performed by: OBSTETRICS & GYNECOLOGY

## 2020-08-17 NOTE — PROGRESS NOTES
GYN Annual Exam     CC- Here for annual exam.     Pt new to practice? No  Pt new to me? No     Abigail Hines is a 60 y.o.  female who presents for annual well woman exam. No LMP recorded. Patient is postmenopausal.    Problems in addition to need for annual: needs mammo and dexa    HPI: History of Present Illness    PMHX:  Patient Active Problem List   Diagnosis   • Infected sebaceous cyst   • Type 2 diabetes mellitus (CMS/HCC)   • Increased BMI   • Chronic anxiety   • White coat syndrome with diagnosis of hypertension   ; otherwise none    OB History        1    Para   1    Term   1            AB        Living   1       SAB        TAB        Ectopic        Molar        Multiple        Live Births   1          Obstetric Comments   First delivery for Dr. Jacob in private practice.                Past Medical History:   Diagnosis Date   • Breast nodule     Had calcium deposits on her first MG   • Diabetes mellitus (CMS/HCC)     Type 2 DM   • History of Papanicolaou smear of cervix      Never had an abnormal Pap smear.   Pap smear Hx:  [id number], normal yearly Paps.  , neg pap smear, neg HR-HPV.    • Hyperlipidemia     Never had high cholesterol, started simvastatin because of Diabetes.   • Hypertension    • Kidney stones     Intermittantly for the last 10 years, but none for last couple of years.   • Menopausal state    • Retinal detachment 2010    Twice, 2008 & 2010.       Past Surgical History:   Procedure Laterality Date   • CATARACT EXTRACTION, BILATERAL     •  SECTION      CPD, Macrosomic.   • CHOLECYSTECTOMY      Non functional GB.   • COLONOSCOPY      Dr. Hogan  normal repeat in five years (hx of colon polyps).  Verify dates   • EXPLORATORY LAPAROTOMY     • TUBAL ABDOMINAL LIGATION           Current Outpatient Medications:   •  Cholecalciferol (VITAMIN D) 1000 UNITS tablet, Take 5,000 Units by mouth., Disp: , Rfl:   •  simvastatin (ZOCOR) 20  "MG tablet, Take 20 mg by mouth every night., Disp: , Rfl:   •  spironolactone (ALDACTONE) 50 MG tablet, Take 50 mg by mouth daily., Disp: , Rfl:     Allergies   Allergen Reactions   • Penicillins Rash       Social History     Tobacco Use   • Smoking status: Never Smoker   • Smokeless tobacco: Never Used   Substance Use Topics   • Alcohol use: No   • Drug use: No       Smoker: No    Family History   Problem Relation Age of Onset   • Breast cancer Mother 55        X2 ( 2 SEPARATE CANCERS).   • Dementia Father         RAPID ONSET AND VERY PROGRESSIVE.   • No Known Problems Sister    • No Known Problems Brother    • Anxiety disorder Son    • Osteoporosis Maternal Grandmother    • Breast cancer Paternal Grandmother 90          OF OLD AGE   • Osteoporosis Maternal Aunt    • Breast cancer Maternal Aunt 55   • Breast cancer Paternal Aunt    • Heart disease Maternal Grandfather    • Lung cancer Paternal Grandfather    • BRCA 1/2 Neg Hx    • Colon cancer Neg Hx    • Endometrial cancer Neg Hx    • Ovarian cancer Neg Hx        Review of Systems        EXAM:  /86   Ht 154.9 cm (61\")   Wt 70.8 kg (156 lb)   BMI 29.48 kg/m²     Physical Exam   Constitutional: She is oriented to person, place, and time. She appears well-developed and well-nourished. No distress.   HENT:   Head: Normocephalic and atraumatic.   Eyes: EOM are normal.   Neck: Normal range of motion.   Cardiovascular: Normal rate.   Pulmonary/Chest: Effort normal.   Abdominal: Soft. She exhibits no distension and no mass. There is no tenderness. There is no guarding.   Genitourinary: Vagina normal and uterus normal. No vaginal discharge found.   Genitourinary Comments: cx wnl, pap done   Musculoskeletal: Normal range of motion. She exhibits no edema, tenderness or deformity.   Neurological: She is alert and oriented to person, place, and time.   Skin: Skin is warm and dry. No rash noted. She is not diaphoretic. No erythema. No pallor.   Breasts wnl " bilaterally   Psychiatric: She has a normal mood and affect. Her behavior is normal. Judgment and thought content normal.   Nursing note and vitals reviewed.         As part of wellness and prevention, the following topics were discussed with the patient:  []  Nutrition  []  Physical activity/regular exercise   [x]  Healthy weight  []  Injury prevention  [x]  Substance misuse/abuse  []  Sexual behavior  []  STD prevention  []  Contaception  []  Dental health  [x]  Mental health  []  Immunization  [x]  Encouraged SBE     Counseling and guidance done:  Nutrition, physical activity, healthy weight, injury prevention, misuse of tobacco, alcohol and drugs, sexual behavior and STDs, contraception, dental health, mental health, immunizations breast cancer screening and exams.    Assessment     1) GYN annual well woman exam.   2) PAP done today? Yes  3) problems addressed: none    MAMMOGRAM UP TO DATE IF AGE APPROPRIATE?  No    COLONOSCOPY UP TO DATE IF AGE APPROPRIATE? Yes    Fhx breast cancer? Yesmother    Fhx ovarian cancer? No    Fhx colon cancer? No    Invitae testing offered? Yes           Plan       Follow up prn or one year.    Abigail was seen today for gynecologic exam.    Diagnoses and all orders for this visit:    Screening for unspecified condition  -     POC Urinalysis Dipstick    Type 2 diabetes mellitus with hyperosmolarity without coma, without long-term current use of insulin (CMS/HCC)    Increased BMI    Chronic anxiety    White coat syndrome with diagnosis of hypertension    Well woman exam  -     Mammo Screening Digital Tomosynthesis Bilateral With CAD; Future  -     DEXA Bone Density Axial; Future        RTO Return in about 1 year (around 8/17/2021) for Annual physical.          Kirill Bosch MD  [unfilled]  11:38

## 2020-08-20 LAB
CYTOLOGIST CVX/VAG CYTO: NORMAL
CYTOLOGY CVX/VAG DOC CYTO: NORMAL
CYTOLOGY CVX/VAG DOC THIN PREP: NORMAL
DX ICD CODE: NORMAL
HIV 1 & 2 AB SER-IMP: NORMAL
HPV I/H RISK 1 DNA CVX QL PROBE+SIG AMP: NEGATIVE
OTHER STN SPEC: NORMAL
STAT OF ADQ CVX/VAG CYTO-IMP: NORMAL

## 2020-10-05 ENCOUNTER — HOSPITAL ENCOUNTER (OUTPATIENT)
Dept: MAMMOGRAPHY | Facility: HOSPITAL | Age: 60
Discharge: HOME | End: 2020-10-05

## 2020-10-05 ENCOUNTER — HOSPITAL ENCOUNTER (OUTPATIENT)
Dept: BONE DENSITY | Facility: HOSPITAL | Age: 60
Discharge: HOME | End: 2020-10-05

## 2020-10-05 DIAGNOSIS — Z01.419 WELL WOMAN EXAM: ICD-10-CM

## 2020-10-05 PROCEDURE — 77080 DXA BONE DENSITY AXIAL: CPT

## 2020-10-05 PROCEDURE — 77067 SCR MAMMO BI INCL CAD: CPT

## 2020-10-05 PROCEDURE — 77063 BREAST TOMOSYNTHESIS BI: CPT

## 2020-11-23 ENCOUNTER — LAB (OUTPATIENT)
Dept: LAB | Facility: HOSPITAL | Age: 60
End: 2020-11-23

## 2020-11-23 ENCOUNTER — TRANSCRIBE ORDERS (OUTPATIENT)
Dept: ADMINISTRATIVE | Facility: HOSPITAL | Age: 60
End: 2020-11-23

## 2020-11-23 DIAGNOSIS — E55.9 VITAMIN D DEFICIENCY: ICD-10-CM

## 2020-11-23 DIAGNOSIS — I10 ESSENTIAL HYPERTENSION, MALIGNANT: Primary | ICD-10-CM

## 2020-11-23 DIAGNOSIS — E78.5 HYPERLIPIDEMIA, UNSPECIFIED HYPERLIPIDEMIA TYPE: ICD-10-CM

## 2020-11-23 DIAGNOSIS — I10 ESSENTIAL HYPERTENSION, MALIGNANT: ICD-10-CM

## 2020-11-23 DIAGNOSIS — E11.9 DIABETES MELLITUS WITHOUT COMPLICATION (HCC): ICD-10-CM

## 2020-11-23 LAB
25(OH)D3 SERPL-MCNC: 84.3 NG/ML (ref 30–100)
ALBUMIN SERPL-MCNC: 4.3 G/DL (ref 3.5–5.2)
ALBUMIN/GLOB SERPL: 2.2 G/DL
ALP SERPL-CCNC: 58 U/L (ref 39–117)
ALT SERPL W P-5'-P-CCNC: 38 U/L (ref 1–33)
ANION GAP SERPL CALCULATED.3IONS-SCNC: 9.6 MMOL/L (ref 5–15)
AST SERPL-CCNC: 34 U/L (ref 1–32)
BILIRUB SERPL-MCNC: 0.6 MG/DL (ref 0–1.2)
BUN SERPL-MCNC: 9 MG/DL (ref 8–23)
BUN/CREAT SERPL: 12.7 (ref 7–25)
CALCIUM SPEC-SCNC: 10 MG/DL (ref 8.6–10.5)
CHLORIDE SERPL-SCNC: 98 MMOL/L (ref 98–107)
CHOLEST SERPL-MCNC: 127 MG/DL (ref 0–200)
CO2 SERPL-SCNC: 30.4 MMOL/L (ref 22–29)
CREAT SERPL-MCNC: 0.71 MG/DL (ref 0.57–1)
GFR SERPL CREATININE-BSD FRML MDRD: 83 ML/MIN/1.73
GLOBULIN UR ELPH-MCNC: 2 GM/DL
GLUCOSE SERPL-MCNC: 115 MG/DL (ref 65–99)
HBA1C MFR BLD: 6.4 % (ref 4.8–5.6)
HDLC SERPL-MCNC: 43 MG/DL (ref 40–60)
LDLC SERPL CALC-MCNC: 61 MG/DL (ref 0–100)
LDLC/HDLC SERPL: 1.34 {RATIO}
POTASSIUM SERPL-SCNC: 4.3 MMOL/L (ref 3.5–5.2)
PROT SERPL-MCNC: 6.3 G/DL (ref 6–8.5)
SODIUM SERPL-SCNC: 138 MMOL/L (ref 136–145)
TRIGL SERPL-MCNC: 131 MG/DL (ref 0–150)
VLDLC SERPL-MCNC: 23 MG/DL (ref 5–40)

## 2020-11-23 PROCEDURE — 83036 HEMOGLOBIN GLYCOSYLATED A1C: CPT

## 2020-11-23 PROCEDURE — 80053 COMPREHEN METABOLIC PANEL: CPT

## 2020-11-23 PROCEDURE — 82306 VITAMIN D 25 HYDROXY: CPT

## 2020-11-23 PROCEDURE — 80061 LIPID PANEL: CPT

## 2020-11-23 PROCEDURE — 36415 COLL VENOUS BLD VENIPUNCTURE: CPT

## 2021-03-15 ENCOUNTER — TRANSCRIBE ORDERS (OUTPATIENT)
Dept: ADMINISTRATIVE | Facility: HOSPITAL | Age: 61
End: 2021-03-15

## 2021-03-15 ENCOUNTER — LAB (OUTPATIENT)
Dept: LAB | Facility: HOSPITAL | Age: 61
End: 2021-03-15

## 2021-03-15 DIAGNOSIS — E78.5 HYPERLIPIDEMIA, UNSPECIFIED HYPERLIPIDEMIA TYPE: ICD-10-CM

## 2021-03-15 DIAGNOSIS — I10 ESSENTIAL HYPERTENSION, MALIGNANT: ICD-10-CM

## 2021-03-15 DIAGNOSIS — E11.9 TYPE 2 DIABETES MELLITUS WITHOUT COMPLICATION, WITHOUT LONG-TERM CURRENT USE OF INSULIN (HCC): ICD-10-CM

## 2021-03-15 DIAGNOSIS — Z00.00 ROUTINE GENERAL MEDICAL EXAMINATION AT A HEALTH CARE FACILITY: ICD-10-CM

## 2021-03-15 DIAGNOSIS — Z00.00 ROUTINE GENERAL MEDICAL EXAMINATION AT A HEALTH CARE FACILITY: Primary | ICD-10-CM

## 2021-03-15 LAB
ALBUMIN SERPL-MCNC: 4.3 G/DL (ref 3.5–5.2)
ALBUMIN/GLOB SERPL: 2 G/DL
ALP SERPL-CCNC: 50 U/L (ref 39–117)
ALT SERPL W P-5'-P-CCNC: 38 U/L (ref 1–33)
ANION GAP SERPL CALCULATED.3IONS-SCNC: 9.3 MMOL/L (ref 5–15)
AST SERPL-CCNC: 30 U/L (ref 1–32)
BASOPHILS # BLD AUTO: 0.07 10*3/MM3 (ref 0–0.2)
BASOPHILS NFR BLD AUTO: 0.9 % (ref 0–1.5)
BILIRUB SERPL-MCNC: 0.6 MG/DL (ref 0–1.2)
BUN SERPL-MCNC: 7 MG/DL (ref 8–23)
BUN/CREAT SERPL: 18.4 (ref 7–25)
CALCIUM SPEC-SCNC: 9.6 MG/DL (ref 8.6–10.5)
CHLORIDE SERPL-SCNC: 97 MMOL/L (ref 98–107)
CHOLEST SERPL-MCNC: 107 MG/DL (ref 0–200)
CO2 SERPL-SCNC: 29.7 MMOL/L (ref 22–29)
CREAT SERPL-MCNC: 0.38 MG/DL (ref 0.57–1)
DEPRECATED RDW RBC AUTO: 38.8 FL (ref 37–54)
EOSINOPHIL # BLD AUTO: 0.4 10*3/MM3 (ref 0–0.4)
EOSINOPHIL NFR BLD AUTO: 5.4 % (ref 0.3–6.2)
ERYTHROCYTE [DISTWIDTH] IN BLOOD BY AUTOMATED COUNT: 11.5 % (ref 12.3–15.4)
GFR SERPL CREATININE-BSD FRML MDRD: >150 ML/MIN/1.73
GLOBULIN UR ELPH-MCNC: 2.2 GM/DL
GLUCOSE SERPL-MCNC: 119 MG/DL (ref 65–99)
HBA1C MFR BLD: 6.04 % (ref 4.8–5.6)
HCT VFR BLD AUTO: 39.5 % (ref 34–46.6)
HDLC SERPL-MCNC: 47 MG/DL (ref 40–60)
HGB BLD-MCNC: 13.6 G/DL (ref 12–15.9)
IMM GRANULOCYTES # BLD AUTO: 0.02 10*3/MM3 (ref 0–0.05)
IMM GRANULOCYTES NFR BLD AUTO: 0.3 % (ref 0–0.5)
LDLC SERPL CALC-MCNC: 39 MG/DL (ref 0–100)
LDLC/HDLC SERPL: 0.78 {RATIO}
LYMPHOCYTES # BLD AUTO: 2.18 10*3/MM3 (ref 0.7–3.1)
LYMPHOCYTES NFR BLD AUTO: 29.3 % (ref 19.6–45.3)
MCH RBC QN AUTO: 32.2 PG (ref 26.6–33)
MCHC RBC AUTO-ENTMCNC: 34.4 G/DL (ref 31.5–35.7)
MCV RBC AUTO: 93.4 FL (ref 79–97)
MONOCYTES # BLD AUTO: 0.53 10*3/MM3 (ref 0.1–0.9)
MONOCYTES NFR BLD AUTO: 7.1 % (ref 5–12)
NEUTROPHILS NFR BLD AUTO: 4.25 10*3/MM3 (ref 1.7–7)
NEUTROPHILS NFR BLD AUTO: 57 % (ref 42.7–76)
NRBC BLD AUTO-RTO: 0 /100 WBC (ref 0–0.2)
PLATELET # BLD AUTO: 526 10*3/MM3 (ref 140–450)
PMV BLD AUTO: 9.5 FL (ref 6–12)
POTASSIUM SERPL-SCNC: 4.5 MMOL/L (ref 3.5–5.2)
PROT SERPL-MCNC: 6.5 G/DL (ref 6–8.5)
RBC # BLD AUTO: 4.23 10*6/MM3 (ref 3.77–5.28)
SODIUM SERPL-SCNC: 136 MMOL/L (ref 136–145)
TRIGL SERPL-MCNC: 116 MG/DL (ref 0–150)
VLDLC SERPL-MCNC: 21 MG/DL (ref 5–40)
WBC # BLD AUTO: 7.45 10*3/MM3 (ref 3.4–10.8)

## 2021-03-15 PROCEDURE — 85025 COMPLETE CBC W/AUTO DIFF WBC: CPT

## 2021-03-15 PROCEDURE — 83036 HEMOGLOBIN GLYCOSYLATED A1C: CPT

## 2021-03-15 PROCEDURE — 80061 LIPID PANEL: CPT

## 2021-03-15 PROCEDURE — 36415 COLL VENOUS BLD VENIPUNCTURE: CPT

## 2021-03-15 PROCEDURE — 80053 COMPREHEN METABOLIC PANEL: CPT

## 2021-03-16 ENCOUNTER — BULK ORDERING (OUTPATIENT)
Dept: CASE MANAGEMENT | Facility: OTHER | Age: 61
End: 2021-03-16

## 2021-03-16 DIAGNOSIS — Z23 IMMUNIZATION DUE: ICD-10-CM

## 2021-09-13 ENCOUNTER — LAB (OUTPATIENT)
Dept: LAB | Facility: HOSPITAL | Age: 61
End: 2021-09-13

## 2021-09-13 ENCOUNTER — TRANSCRIBE ORDERS (OUTPATIENT)
Dept: ADMINISTRATIVE | Facility: HOSPITAL | Age: 61
End: 2021-09-13

## 2021-09-13 DIAGNOSIS — E55.9 VITAMIN D DEFICIENCY: ICD-10-CM

## 2021-09-13 DIAGNOSIS — Z00.00 ROUTINE GENERAL MEDICAL EXAMINATION AT A HEALTH CARE FACILITY: ICD-10-CM

## 2021-09-13 DIAGNOSIS — I10 ESSENTIAL HYPERTENSION, MALIGNANT: ICD-10-CM

## 2021-09-13 DIAGNOSIS — E11.9 DIABETES MELLITUS WITHOUT COMPLICATION (HCC): ICD-10-CM

## 2021-09-13 DIAGNOSIS — E78.5 HYPERLIPIDEMIA, UNSPECIFIED HYPERLIPIDEMIA TYPE: ICD-10-CM

## 2021-09-13 DIAGNOSIS — Z00.00 ROUTINE GENERAL MEDICAL EXAMINATION AT A HEALTH CARE FACILITY: Primary | ICD-10-CM

## 2021-09-13 LAB
25(OH)D3 SERPL-MCNC: 79.1 NG/ML (ref 30–100)
ALBUMIN SERPL-MCNC: 4.3 G/DL (ref 3.5–5.2)
ALBUMIN/GLOB SERPL: 2.7 G/DL
ALP SERPL-CCNC: 52 U/L (ref 39–117)
ALT SERPL W P-5'-P-CCNC: 32 U/L (ref 1–33)
ANION GAP SERPL CALCULATED.3IONS-SCNC: 11.1 MMOL/L (ref 5–15)
AST SERPL-CCNC: 26 U/L (ref 1–32)
BACTERIA UR QL AUTO: ABNORMAL /HPF
BASOPHILS # BLD AUTO: 0.07 10*3/MM3 (ref 0–0.2)
BASOPHILS NFR BLD AUTO: 1.1 % (ref 0–1.5)
BILIRUB SERPL-MCNC: 0.7 MG/DL (ref 0–1.2)
BILIRUB UR QL STRIP: NEGATIVE
BUN SERPL-MCNC: 8 MG/DL (ref 8–23)
BUN/CREAT SERPL: 12.7 (ref 7–25)
CALCIUM SPEC-SCNC: 9.5 MG/DL (ref 8.6–10.5)
CHLORIDE SERPL-SCNC: 93 MMOL/L (ref 98–107)
CHOLEST SERPL-MCNC: 120 MG/DL (ref 0–200)
CLARITY UR: CLEAR
CO2 SERPL-SCNC: 27.9 MMOL/L (ref 22–29)
COLOR UR: YELLOW
CREAT SERPL-MCNC: 0.63 MG/DL (ref 0.57–1)
DEPRECATED RDW RBC AUTO: 38.7 FL (ref 37–54)
EOSINOPHIL # BLD AUTO: 0.4 10*3/MM3 (ref 0–0.4)
EOSINOPHIL NFR BLD AUTO: 6.3 % (ref 0.3–6.2)
ERYTHROCYTE [DISTWIDTH] IN BLOOD BY AUTOMATED COUNT: 11.4 % (ref 12.3–15.4)
FOLATE SERPL-MCNC: 18.3 NG/ML (ref 4.78–24.2)
GFR SERPL CREATININE-BSD FRML MDRD: 95 ML/MIN/1.73
GLOBULIN UR ELPH-MCNC: 1.6 GM/DL
GLUCOSE SERPL-MCNC: 98 MG/DL (ref 65–99)
GLUCOSE UR STRIP-MCNC: NEGATIVE MG/DL
HBA1C MFR BLD: 5.62 % (ref 4.8–5.6)
HCT VFR BLD AUTO: 39 % (ref 34–46.6)
HDLC SERPL-MCNC: 51 MG/DL (ref 40–60)
HGB BLD-MCNC: 13.2 G/DL (ref 12–15.9)
HGB UR QL STRIP.AUTO: ABNORMAL
HYALINE CASTS UR QL AUTO: ABNORMAL /LPF
IMM GRANULOCYTES # BLD AUTO: 0.02 10*3/MM3 (ref 0–0.05)
IMM GRANULOCYTES NFR BLD AUTO: 0.3 % (ref 0–0.5)
KETONES UR QL STRIP: NEGATIVE
LDLC SERPL CALC-MCNC: 51 MG/DL (ref 0–100)
LDLC/HDLC SERPL: 0.98 {RATIO}
LEUKOCYTE ESTERASE UR QL STRIP.AUTO: NEGATIVE
LYMPHOCYTES # BLD AUTO: 2.19 10*3/MM3 (ref 0.7–3.1)
LYMPHOCYTES NFR BLD AUTO: 34.8 % (ref 19.6–45.3)
MCH RBC QN AUTO: 31.7 PG (ref 26.6–33)
MCHC RBC AUTO-ENTMCNC: 33.8 G/DL (ref 31.5–35.7)
MCV RBC AUTO: 93.5 FL (ref 79–97)
MONOCYTES # BLD AUTO: 0.59 10*3/MM3 (ref 0.1–0.9)
MONOCYTES NFR BLD AUTO: 9.4 % (ref 5–12)
NEUTROPHILS NFR BLD AUTO: 3.03 10*3/MM3 (ref 1.7–7)
NEUTROPHILS NFR BLD AUTO: 48.1 % (ref 42.7–76)
NITRITE UR QL STRIP: NEGATIVE
NRBC BLD AUTO-RTO: 0 /100 WBC (ref 0–0.2)
PH UR STRIP.AUTO: 7 [PH] (ref 5–8)
PLATELET # BLD AUTO: 499 10*3/MM3 (ref 140–450)
PMV BLD AUTO: 9.6 FL (ref 6–12)
POTASSIUM SERPL-SCNC: 4.1 MMOL/L (ref 3.5–5.2)
PROT SERPL-MCNC: 5.9 G/DL (ref 6–8.5)
PROT UR QL STRIP: NEGATIVE
RBC # BLD AUTO: 4.17 10*6/MM3 (ref 3.77–5.28)
RBC # UR: ABNORMAL /HPF
REF LAB TEST METHOD: ABNORMAL
SODIUM SERPL-SCNC: 132 MMOL/L (ref 136–145)
SP GR UR STRIP: 1.01 (ref 1–1.03)
SQUAMOUS #/AREA URNS HPF: ABNORMAL /HPF
TRIGL SERPL-MCNC: 95 MG/DL (ref 0–150)
TSH SERPL DL<=0.05 MIU/L-ACNC: 2.16 UIU/ML (ref 0.27–4.2)
UROBILINOGEN UR QL STRIP: ABNORMAL
VIT B12 BLD-MCNC: 700 PG/ML (ref 211–946)
VLDLC SERPL-MCNC: 18 MG/DL (ref 5–40)
WBC # BLD AUTO: 6.3 10*3/MM3 (ref 3.4–10.8)
WBC UR QL AUTO: ABNORMAL /HPF

## 2021-09-13 PROCEDURE — 82607 VITAMIN B-12: CPT

## 2021-09-13 PROCEDURE — 83036 HEMOGLOBIN GLYCOSYLATED A1C: CPT

## 2021-09-13 PROCEDURE — 84443 ASSAY THYROID STIM HORMONE: CPT

## 2021-09-13 PROCEDURE — 80061 LIPID PANEL: CPT

## 2021-09-13 PROCEDURE — 82306 VITAMIN D 25 HYDROXY: CPT

## 2021-09-13 PROCEDURE — 85025 COMPLETE CBC W/AUTO DIFF WBC: CPT

## 2021-09-13 PROCEDURE — 82746 ASSAY OF FOLIC ACID SERUM: CPT

## 2021-09-13 PROCEDURE — 81001 URINALYSIS AUTO W/SCOPE: CPT

## 2021-09-13 PROCEDURE — 36415 COLL VENOUS BLD VENIPUNCTURE: CPT

## 2021-09-13 PROCEDURE — 80053 COMPREHEN METABOLIC PANEL: CPT

## 2021-10-11 ENCOUNTER — OFFICE VISIT (OUTPATIENT)
Dept: OBSTETRICS AND GYNECOLOGY | Facility: CLINIC | Age: 61
End: 2021-10-11

## 2021-10-11 VITALS
HEIGHT: 61 IN | SYSTOLIC BLOOD PRESSURE: 144 MMHG | DIASTOLIC BLOOD PRESSURE: 82 MMHG | BODY MASS INDEX: 28.05 KG/M2 | WEIGHT: 148.6 LBS

## 2021-10-11 DIAGNOSIS — I10 WHITE COAT SYNDROME WITH DIAGNOSIS OF HYPERTENSION: ICD-10-CM

## 2021-10-11 DIAGNOSIS — E11.00 TYPE 2 DIABETES MELLITUS WITH HYPEROSMOLARITY WITHOUT COMA, WITHOUT LONG-TERM CURRENT USE OF INSULIN (HCC): ICD-10-CM

## 2021-10-11 DIAGNOSIS — Z01.419 WELL WOMAN EXAM: ICD-10-CM

## 2021-10-11 DIAGNOSIS — F41.9 CHRONIC ANXIETY: ICD-10-CM

## 2021-10-11 DIAGNOSIS — M85.80 OSTEOPENIA, SENILE: ICD-10-CM

## 2021-10-11 DIAGNOSIS — Z13.9 SCREENING FOR CONDITION: Primary | ICD-10-CM

## 2021-10-11 PROBLEM — R63.8 INCREASED BMI: Status: RESOLVED | Noted: 2019-08-05 | Resolved: 2021-10-11

## 2021-10-11 LAB
BILIRUB BLD-MCNC: NEGATIVE MG/DL
CLARITY, POC: CLEAR
COLOR UR: YELLOW
GLUCOSE UR STRIP-MCNC: NEGATIVE MG/DL
KETONES UR QL: NEGATIVE
LEUKOCYTE EST, POC: NEGATIVE
NITRITE UR-MCNC: NEGATIVE MG/ML
PH UR: 5 [PH] (ref 5–8)
PROT UR STRIP-MCNC: NEGATIVE MG/DL
RBC # UR STRIP: NEGATIVE /UL
SP GR UR: 1 (ref 1–1.03)
UROBILINOGEN UR QL: NORMAL

## 2021-10-11 PROCEDURE — 99396 PREV VISIT EST AGE 40-64: CPT | Performed by: OBSTETRICS & GYNECOLOGY

## 2021-10-11 PROCEDURE — 81002 URINALYSIS NONAUTO W/O SCOPE: CPT | Performed by: OBSTETRICS & GYNECOLOGY

## 2021-10-11 RX ORDER — LISINOPRIL 5 MG/1
TABLET ORAL
COMMUNITY
Start: 2021-10-10

## 2021-10-11 NOTE — PROGRESS NOTES
GYN Annual Exam     CC- Here for annual exam.     Pt new to practice? No  Pt new to me? No     Abigail Hines is a 61 y.o.  female who presents for annual well woman exam. No LMP recorded. Patient is postmenopausal.    Problems in addition to need for annual: none    HPI: History of Present Illness    PMHX:  Patient Active Problem List   Diagnosis   • Infected sebaceous cyst   • Type 2 diabetes mellitus (HCC)   • Chronic anxiety   • White coat syndrome with diagnosis of hypertension   • Osteopenia, senile   ; otherwise none    OB History        1    Para   1    Term   1            AB        Living   1       SAB        IAB        Ectopic        Molar        Multiple        Live Births   1          Obstetric Comments   First delivery for Dr. Jacob in private practice.                Past Medical History:   Diagnosis Date   • Breast nodule     Had calcium deposits on her first MG   • Diabetes mellitus (HCC)     Type 2 DM   • History of Papanicolaou smear of cervix      Never had an abnormal Pap smear.   Pap smear Hx:  [id number], normal yearly Paps.  , neg pap smear, neg HR-HPV.    • Hyperlipidemia     Never had high cholesterol, started simvastatin because of Diabetes.   • Hypertension    • Kidney stones     Intermittantly for the last 10 years, but none for last couple of years.   • Menopausal state    • Retinal detachment 2010    Twice, 2008 & 2010.       Past Surgical History:   Procedure Laterality Date   • CATARACT EXTRACTION, BILATERAL     •  SECTION      CPD, Macrosomic.   • CHOLECYSTECTOMY      Non functional GB.   • COLONOSCOPY      Dr. Hogan  normal repeat in five years (hx of colon polyps).  Verify dates   • EXPLORATORY LAPAROTOMY     • TUBAL ABDOMINAL LIGATION           Current Outpatient Medications:   •  metFORMIN (GLUCOPHAGE) 1000 MG tablet, , Disp: , Rfl:   •  Cholecalciferol (VITAMIN D) 1000 UNITS tablet, Take 5,000 Units by mouth.,  "Disp: , Rfl:   •  lisinopril (PRINIVIL,ZESTRIL) 5 MG tablet, , Disp: , Rfl:   •  simvastatin (ZOCOR) 20 MG tablet, Take 20 mg by mouth every night., Disp: , Rfl:   •  spironolactone (ALDACTONE) 50 MG tablet, Take 50 mg by mouth daily., Disp: , Rfl:     Allergies   Allergen Reactions   • Penicillins Rash       Social History     Tobacco Use   • Smoking status: Never Smoker   • Smokeless tobacco: Never Used   Substance Use Topics   • Alcohol use: No   • Drug use: No       Abigail Hines  reports that she has never smoked. She has never used smokeless tobacco..            Family History   Problem Relation Age of Onset   • Breast cancer Mother 55        X2 ( 2 SEPARATE CANCERS).   • Dementia Father         RAPID ONSET AND VERY PROGRESSIVE.   • No Known Problems Sister    • No Known Problems Brother    • Anxiety disorder Son    • Osteoporosis Maternal Grandmother    • Breast cancer Paternal Grandmother 90          OF OLD AGE   • Osteoporosis Maternal Aunt    • Breast cancer Maternal Aunt 55   • Breast cancer Paternal Aunt    • Heart disease Maternal Grandfather    • Lung cancer Paternal Grandfather    • BRCA 1/2 Neg Hx    • Colon cancer Neg Hx    • Endometrial cancer Neg Hx    • Ovarian cancer Neg Hx        Review of Systems    Patient reports that she is not currently experiencing any symptoms of urinary incontinence.      noTESTED FOR CHLAMYDIA?    EXAM:  /82   Ht 154.9 cm (61\")   Wt 67.4 kg (148 lb 9.6 oz)   BMI 28.08 kg/m²     Labs:   Lab Results (last 24 hours)     Procedure Component Value Units Date/Time    POC Urinalysis Dipstick [[id number]]  (Normal) Collected: 10/11/21 104    Specimen: Urine Updated: 10/11/21 1046     Color Yellow     Clarity, UA Clear     Glucose, UA Negative mg/dL      Bilirubin Negative     Ketones, UA Negative     Specific Gravity  1.005     Blood, UA Negative     pH, Urine 5.0     Protein, POC Negative mg/dL      Urobilinogen, UA Normal     Leukocytes Negative     " Nitrite, UA Negative          Physical Exam  Vitals and nursing note reviewed. Exam conducted with a chaperone present.   Constitutional:       General: She is not in acute distress.     Appearance: She is well-developed. She is not diaphoretic.   HENT:      Head: Normocephalic and atraumatic.      Nose: Nose normal.   Eyes:      Extraocular Movements: Extraocular movements intact.   Cardiovascular:      Rate and Rhythm: Normal rate.   Pulmonary:      Effort: Pulmonary effort is normal.   Chest:   Breasts: Breasts are symmetrical.      Right: Normal. No mass, nipple discharge, skin change, tenderness or axillary adenopathy.      Left: Normal. No mass, nipple discharge, skin change, tenderness or axillary adenopathy.       Abdominal:      General: There is no distension.      Palpations: Abdomen is soft. There is no mass.      Tenderness: There is no abdominal tenderness. There is no guarding.   Genitourinary:     General: Normal vulva.      Pubic Area: No rash.       Vagina: Normal. No vaginal discharge.      Cervix: Normal.      Uterus: Normal.       Adnexa: Right adnexa normal and left adnexa normal.   Musculoskeletal:         General: No tenderness or deformity. Normal range of motion.      Cervical back: Normal range of motion.   Lymphadenopathy:      Upper Body:      Right upper body: No axillary adenopathy.      Left upper body: No axillary adenopathy.   Skin:     General: Skin is warm and dry.      Coloration: Skin is not pale.      Findings: No erythema or rash.   Neurological:      Mental Status: She is alert and oriented to person, place, and time.   Psychiatric:         Behavior: Behavior normal.         Thought Content: Thought content normal.         Judgment: Judgment normal.            As part of wellness and prevention, the following topics were discussed with the patient:  []  Nutrition  []  Physical activity/regular exercise   [x]  Healthy weight  []  Injury prevention  [x]  Substance  misuse/abuse  []  Sexual behavior  []  STD prevention  []  Contaception  []  Dental health  [x]  Mental health  []  Immunization  [x]  Encouraged SBE     Counseling and guidance done:  Nutrition, physical activity, healthy weight, injury prevention, misuse of tobacco, alcohol and drugs, sexual behavior and STDs, contraception, dental health, mental health, immunizations breast cancer screening and exams.    I saw the patient with a face mask, gloves and eye protection  The patient herself was masked.  Social distancing was observed as appropriate. All COVID precautions observed.      Assessment     1) GYN annual well woman exam.   2) PAP done today? No  3) problems addressed: none    MAMMOGRAM UP TO DATE IF AGE APPROPRIATE?  Yes    COLONOSCOPY UP TO DATE IF AGE APPROPRIATE? Yes    Fhx breast cancer? No    Fhx ovarian cancer? No    Fhx colon cancer? No    Invitae testing offered? Yes           Plan       Follow up prn or one year.    Diagnoses and all orders for this visit:    1. Screening for condition (Primary)  -     POC Urinalysis Dipstick  -     Mammo Screening Digital Tomosynthesis Bilateral With CAD; Future  -     Ambulatory Referral For Screening Colonoscopy  -     DEXA Bone Density Axial; Future    2. White coat syndrome with diagnosis of hypertension    3. Type 2 diabetes mellitus with hyperosmolarity without coma, without long-term current use of insulin (HCC)    4. Chronic anxiety    5. Well woman exam    6. Osteopenia, senile        RTO Return in about 1 year (around 10/11/2022) for Recheck.        Kirill Bosch MD  [unfilled]  11:00 EDT

## 2021-10-21 ENCOUNTER — TELEPHONE (OUTPATIENT)
Dept: GASTROENTEROLOGY | Facility: CLINIC | Age: 61
End: 2021-10-21

## 2021-10-21 NOTE — TELEPHONE ENCOUNTER
Sheila Kirk,     This pt called returning a call/msg you left her. She wasn't sure as to why.     After checking her chart, I assume it was in regards to scheduling her CS or NP Appt based on a Gastro referral in her chart from her OBGYN.     She stated that she isn't sure why her OBGYN would've placed that referral and that she already has a Gastro provider. She also states that she isn't due for her next CS until later this year, but that it's already taken care of by her current Gastro provider.     If your call was regarding something else, please call the pt back. Otherwise, disregard.

## 2021-12-16 ENCOUNTER — HOSPITAL ENCOUNTER (OUTPATIENT)
Dept: MAMMOGRAPHY | Facility: HOSPITAL | Age: 61
Discharge: HOME | End: 2021-12-16
Admitting: OBSTETRICS & GYNECOLOGY

## 2021-12-16 DIAGNOSIS — Z13.9 SCREENING FOR CONDITION: ICD-10-CM

## 2021-12-16 PROCEDURE — 77067 SCR MAMMO BI INCL CAD: CPT

## 2021-12-16 PROCEDURE — 77063 BREAST TOMOSYNTHESIS BI: CPT

## 2022-02-21 ENCOUNTER — APPOINTMENT (OUTPATIENT)
Dept: BONE DENSITY | Facility: HOSPITAL | Age: 62
End: 2022-02-21

## 2022-03-14 ENCOUNTER — LAB (OUTPATIENT)
Dept: LAB | Facility: HOSPITAL | Age: 62
End: 2022-03-14

## 2022-03-14 ENCOUNTER — TRANSCRIBE ORDERS (OUTPATIENT)
Dept: ADMINISTRATIVE | Facility: HOSPITAL | Age: 62
End: 2022-03-14

## 2022-03-14 DIAGNOSIS — E11.9 DIABETES MELLITUS WITHOUT COMPLICATION: ICD-10-CM

## 2022-03-14 DIAGNOSIS — E78.5 HYPERLIPIDEMIA, UNSPECIFIED HYPERLIPIDEMIA TYPE: ICD-10-CM

## 2022-03-14 DIAGNOSIS — I10 ESSENTIAL HYPERTENSION, MALIGNANT: ICD-10-CM

## 2022-03-14 DIAGNOSIS — E78.5 HYPERLIPIDEMIA, UNSPECIFIED HYPERLIPIDEMIA TYPE: Primary | ICD-10-CM

## 2022-03-14 LAB
ALBUMIN SERPL-MCNC: 4.4 G/DL (ref 3.5–5.2)
ALBUMIN/GLOB SERPL: 2.3 G/DL
ALP SERPL-CCNC: 56 U/L (ref 39–117)
ALT SERPL W P-5'-P-CCNC: 26 U/L (ref 1–33)
ANION GAP SERPL CALCULATED.3IONS-SCNC: 11 MMOL/L (ref 5–15)
AST SERPL-CCNC: 22 U/L (ref 1–32)
BILIRUB SERPL-MCNC: 0.5 MG/DL (ref 0–1.2)
BILIRUB UR QL STRIP: NEGATIVE
BUN SERPL-MCNC: 9 MG/DL (ref 8–23)
BUN/CREAT SERPL: 12.7 (ref 7–25)
CALCIUM SPEC-SCNC: 9.5 MG/DL (ref 8.6–10.5)
CHLORIDE SERPL-SCNC: 94 MMOL/L (ref 98–107)
CHOLEST SERPL-MCNC: 133 MG/DL (ref 0–200)
CLARITY UR: CLEAR
CO2 SERPL-SCNC: 29 MMOL/L (ref 22–29)
COLOR UR: YELLOW
CREAT SERPL-MCNC: 0.71 MG/DL (ref 0.57–1)
EGFRCR SERPLBLD CKD-EPI 2021: 95.1 ML/MIN/1.73
GLOBULIN UR ELPH-MCNC: 1.9 GM/DL
GLUCOSE SERPL-MCNC: 125 MG/DL (ref 65–99)
GLUCOSE UR STRIP-MCNC: NEGATIVE MG/DL
HBA1C MFR BLD: 6.1 % (ref 4.8–5.6)
HDLC SERPL-MCNC: 50 MG/DL (ref 40–60)
HGB UR QL STRIP.AUTO: NEGATIVE
KETONES UR QL STRIP: NEGATIVE
LDLC SERPL CALC-MCNC: 63 MG/DL (ref 0–100)
LDLC/HDLC SERPL: 1.22 {RATIO}
LEUKOCYTE ESTERASE UR QL STRIP.AUTO: NEGATIVE
NITRITE UR QL STRIP: NEGATIVE
PH UR STRIP.AUTO: 6.5 [PH] (ref 5–8)
POTASSIUM SERPL-SCNC: 4.5 MMOL/L (ref 3.5–5.2)
PROT SERPL-MCNC: 6.3 G/DL (ref 6–8.5)
PROT UR QL STRIP: NEGATIVE
SODIUM SERPL-SCNC: 134 MMOL/L (ref 136–145)
SP GR UR STRIP: 1.01 (ref 1–1.03)
TRIGL SERPL-MCNC: 110 MG/DL (ref 0–150)
UROBILINOGEN UR QL STRIP: NORMAL
VLDLC SERPL-MCNC: 20 MG/DL (ref 5–40)

## 2022-03-14 PROCEDURE — 80053 COMPREHEN METABOLIC PANEL: CPT

## 2022-03-14 PROCEDURE — 83036 HEMOGLOBIN GLYCOSYLATED A1C: CPT

## 2022-03-14 PROCEDURE — 36415 COLL VENOUS BLD VENIPUNCTURE: CPT

## 2022-03-14 PROCEDURE — 81003 URINALYSIS AUTO W/O SCOPE: CPT

## 2022-03-14 PROCEDURE — 80061 LIPID PANEL: CPT

## 2022-09-19 ENCOUNTER — TRANSCRIBE ORDERS (OUTPATIENT)
Dept: ADMINISTRATIVE | Facility: HOSPITAL | Age: 62
End: 2022-09-19

## 2022-09-19 ENCOUNTER — LAB (OUTPATIENT)
Dept: LAB | Facility: HOSPITAL | Age: 62
End: 2022-09-19

## 2022-09-19 DIAGNOSIS — I51.9 MYXEDEMA HEART DISEASE: ICD-10-CM

## 2022-09-19 DIAGNOSIS — E55.9 VITAMIN D DEFICIENCY: ICD-10-CM

## 2022-09-19 DIAGNOSIS — E03.9 MYXEDEMA HEART DISEASE: ICD-10-CM

## 2022-09-19 DIAGNOSIS — I10 ESSENTIAL HYPERTENSION, MALIGNANT: ICD-10-CM

## 2022-09-19 DIAGNOSIS — E78.5 HYPERLIPIDEMIA, UNSPECIFIED HYPERLIPIDEMIA TYPE: ICD-10-CM

## 2022-09-19 DIAGNOSIS — Z00.00 ROUTINE GENERAL MEDICAL EXAMINATION AT A HEALTH CARE FACILITY: ICD-10-CM

## 2022-09-19 DIAGNOSIS — Z00.00 ROUTINE GENERAL MEDICAL EXAMINATION AT A HEALTH CARE FACILITY: Primary | ICD-10-CM

## 2022-09-19 LAB
25(OH)D3 SERPL-MCNC: 86 NG/ML (ref 30–100)
ALBUMIN SERPL-MCNC: 4.3 G/DL (ref 3.5–5.2)
ALBUMIN/GLOB SERPL: 2.3 G/DL
ALP SERPL-CCNC: 55 U/L (ref 39–117)
ALT SERPL W P-5'-P-CCNC: 41 U/L (ref 1–33)
ANION GAP SERPL CALCULATED.3IONS-SCNC: 9.3 MMOL/L (ref 5–15)
AST SERPL-CCNC: 29 U/L (ref 1–32)
BASOPHILS # BLD AUTO: 0.07 10*3/MM3 (ref 0–0.2)
BASOPHILS NFR BLD AUTO: 1 % (ref 0–1.5)
BILIRUB SERPL-MCNC: 0.6 MG/DL (ref 0–1.2)
BILIRUB UR QL STRIP: NEGATIVE
BUN SERPL-MCNC: 11 MG/DL (ref 8–23)
BUN/CREAT SERPL: 13.1 (ref 7–25)
CALCIUM SPEC-SCNC: 9.6 MG/DL (ref 8.6–10.5)
CHLORIDE SERPL-SCNC: 94 MMOL/L (ref 98–107)
CHOLEST SERPL-MCNC: 133 MG/DL (ref 0–200)
CLARITY UR: CLEAR
CO2 SERPL-SCNC: 28.7 MMOL/L (ref 22–29)
COLOR UR: YELLOW
CREAT SERPL-MCNC: 0.84 MG/DL (ref 0.57–1)
DEPRECATED RDW RBC AUTO: 41.2 FL (ref 37–54)
EGFRCR SERPLBLD CKD-EPI 2021: 77.7 ML/MIN/1.73
EOSINOPHIL # BLD AUTO: 0.42 10*3/MM3 (ref 0–0.4)
EOSINOPHIL NFR BLD AUTO: 6 % (ref 0.3–6.2)
ERYTHROCYTE [DISTWIDTH] IN BLOOD BY AUTOMATED COUNT: 11.7 % (ref 12.3–15.4)
FOLATE SERPL-MCNC: >20 NG/ML (ref 4.78–24.2)
GLOBULIN UR ELPH-MCNC: 1.9 GM/DL
GLUCOSE SERPL-MCNC: 130 MG/DL (ref 65–99)
GLUCOSE UR STRIP-MCNC: NEGATIVE MG/DL
HBA1C MFR BLD: 6.3 % (ref 4.8–5.6)
HCT VFR BLD AUTO: 38.9 % (ref 34–46.6)
HDLC SERPL-MCNC: 54 MG/DL (ref 40–60)
HGB BLD-MCNC: 13 G/DL (ref 12–15.9)
HGB UR QL STRIP.AUTO: NEGATIVE
IMM GRANULOCYTES # BLD AUTO: 0.02 10*3/MM3 (ref 0–0.05)
IMM GRANULOCYTES NFR BLD AUTO: 0.3 % (ref 0–0.5)
KETONES UR QL STRIP: NEGATIVE
LDLC SERPL CALC-MCNC: 64 MG/DL (ref 0–100)
LDLC/HDLC SERPL: 1.18 {RATIO}
LEUKOCYTE ESTERASE UR QL STRIP.AUTO: NEGATIVE
LYMPHOCYTES # BLD AUTO: 2.18 10*3/MM3 (ref 0.7–3.1)
LYMPHOCYTES NFR BLD AUTO: 31 % (ref 19.6–45.3)
MCH RBC QN AUTO: 31.9 PG (ref 26.6–33)
MCHC RBC AUTO-ENTMCNC: 33.4 G/DL (ref 31.5–35.7)
MCV RBC AUTO: 95.6 FL (ref 79–97)
MONOCYTES # BLD AUTO: 0.58 10*3/MM3 (ref 0.1–0.9)
MONOCYTES NFR BLD AUTO: 8.2 % (ref 5–12)
NEUTROPHILS NFR BLD AUTO: 3.77 10*3/MM3 (ref 1.7–7)
NEUTROPHILS NFR BLD AUTO: 53.5 % (ref 42.7–76)
NITRITE UR QL STRIP: NEGATIVE
NRBC BLD AUTO-RTO: 0 /100 WBC (ref 0–0.2)
PH UR STRIP.AUTO: 6.5 [PH] (ref 5–8)
PLATELET # BLD AUTO: 535 10*3/MM3 (ref 140–450)
PMV BLD AUTO: 9.5 FL (ref 6–12)
POTASSIUM SERPL-SCNC: 4.6 MMOL/L (ref 3.5–5.2)
PROT SERPL-MCNC: 6.2 G/DL (ref 6–8.5)
PROT UR QL STRIP: NEGATIVE
RBC # BLD AUTO: 4.07 10*6/MM3 (ref 3.77–5.28)
SODIUM SERPL-SCNC: 132 MMOL/L (ref 136–145)
SP GR UR STRIP: 1.01 (ref 1–1.03)
TRIGL SERPL-MCNC: 77 MG/DL (ref 0–150)
TSH SERPL DL<=0.05 MIU/L-ACNC: 1.92 UIU/ML (ref 0.27–4.2)
UROBILINOGEN UR QL STRIP: NORMAL
VIT B12 BLD-MCNC: 750 PG/ML (ref 211–946)
VLDLC SERPL-MCNC: 15 MG/DL (ref 5–40)
WBC NRBC COR # BLD: 7.04 10*3/MM3 (ref 3.4–10.8)

## 2022-09-19 PROCEDURE — 81003 URINALYSIS AUTO W/O SCOPE: CPT

## 2022-09-19 PROCEDURE — 80050 GENERAL HEALTH PANEL: CPT

## 2022-09-19 PROCEDURE — 82607 VITAMIN B-12: CPT

## 2022-09-19 PROCEDURE — 80061 LIPID PANEL: CPT

## 2022-09-19 PROCEDURE — 82306 VITAMIN D 25 HYDROXY: CPT

## 2022-09-19 PROCEDURE — 82746 ASSAY OF FOLIC ACID SERUM: CPT

## 2022-09-19 PROCEDURE — 36415 COLL VENOUS BLD VENIPUNCTURE: CPT

## 2022-09-19 PROCEDURE — 83036 HEMOGLOBIN GLYCOSYLATED A1C: CPT

## 2023-03-22 ENCOUNTER — LAB (OUTPATIENT)
Dept: LAB | Facility: HOSPITAL | Age: 63
End: 2023-03-22
Payer: COMMERCIAL

## 2023-03-22 ENCOUNTER — TRANSCRIBE ORDERS (OUTPATIENT)
Dept: ADMINISTRATIVE | Facility: HOSPITAL | Age: 63
End: 2023-03-22
Payer: COMMERCIAL

## 2023-03-22 DIAGNOSIS — E78.5 HYPERLIPIDEMIA, UNSPECIFIED HYPERLIPIDEMIA TYPE: ICD-10-CM

## 2023-03-22 DIAGNOSIS — E11.9 DIABETES MELLITUS WITHOUT COMPLICATION: Primary | ICD-10-CM

## 2023-03-22 DIAGNOSIS — E11.9 DIABETES MELLITUS WITHOUT COMPLICATION: ICD-10-CM

## 2023-03-22 DIAGNOSIS — I10 ESSENTIAL HYPERTENSION, MALIGNANT: ICD-10-CM

## 2023-03-22 LAB
ALBUMIN SERPL-MCNC: 4.3 G/DL (ref 3.5–5.2)
ALBUMIN/GLOB SERPL: 2 G/DL
ALP SERPL-CCNC: 60 U/L (ref 39–117)
ALT SERPL W P-5'-P-CCNC: 53 U/L (ref 1–33)
ANION GAP SERPL CALCULATED.3IONS-SCNC: 8 MMOL/L (ref 5–15)
AST SERPL-CCNC: 32 U/L (ref 1–32)
BILIRUB SERPL-MCNC: 0.6 MG/DL (ref 0–1.2)
BUN SERPL-MCNC: 9 MG/DL (ref 8–23)
BUN/CREAT SERPL: 12.2 (ref 7–25)
CALCIUM SPEC-SCNC: 9.8 MG/DL (ref 8.6–10.5)
CHLORIDE SERPL-SCNC: 97 MMOL/L (ref 98–107)
CHOLEST SERPL-MCNC: 122 MG/DL (ref 0–200)
CO2 SERPL-SCNC: 29 MMOL/L (ref 22–29)
CREAT SERPL-MCNC: 0.74 MG/DL (ref 0.57–1)
EGFRCR SERPLBLD CKD-EPI 2021: 89.9 ML/MIN/1.73
GLOBULIN UR ELPH-MCNC: 2.1 GM/DL
GLUCOSE SERPL-MCNC: 127 MG/DL (ref 65–99)
HBA1C MFR BLD: 6.7 % (ref 4.8–5.6)
HDLC SERPL-MCNC: 47 MG/DL (ref 40–60)
LDLC SERPL CALC-MCNC: 58 MG/DL (ref 0–100)
LDLC/HDLC SERPL: 1.21 {RATIO}
POTASSIUM SERPL-SCNC: 4.2 MMOL/L (ref 3.5–5.2)
PROT SERPL-MCNC: 6.4 G/DL (ref 6–8.5)
SODIUM SERPL-SCNC: 134 MMOL/L (ref 136–145)
TRIGL SERPL-MCNC: 91 MG/DL (ref 0–150)
VLDLC SERPL-MCNC: 17 MG/DL (ref 5–40)

## 2023-03-22 PROCEDURE — 83036 HEMOGLOBIN GLYCOSYLATED A1C: CPT

## 2023-03-22 PROCEDURE — 80053 COMPREHEN METABOLIC PANEL: CPT

## 2023-03-22 PROCEDURE — 80061 LIPID PANEL: CPT

## 2023-03-22 PROCEDURE — 36415 COLL VENOUS BLD VENIPUNCTURE: CPT

## 2023-03-27 ENCOUNTER — OFFICE VISIT (OUTPATIENT)
Dept: OBSTETRICS AND GYNECOLOGY | Facility: CLINIC | Age: 63
End: 2023-03-27
Payer: COMMERCIAL

## 2023-03-27 VITALS
DIASTOLIC BLOOD PRESSURE: 82 MMHG | SYSTOLIC BLOOD PRESSURE: 118 MMHG | BODY MASS INDEX: 29.64 KG/M2 | HEIGHT: 61 IN | WEIGHT: 157 LBS

## 2023-03-27 DIAGNOSIS — Z11.51 SCREENING FOR HUMAN PAPILLOMAVIRUS: ICD-10-CM

## 2023-03-27 DIAGNOSIS — M85.80 OSTEOPENIA, SENILE: ICD-10-CM

## 2023-03-27 DIAGNOSIS — Z12.11 SCREENING FOR COLON CANCER: ICD-10-CM

## 2023-03-27 DIAGNOSIS — F41.9 CHRONIC ANXIETY: ICD-10-CM

## 2023-03-27 DIAGNOSIS — Z12.31 SCREENING MAMMOGRAM FOR BREAST CANCER: ICD-10-CM

## 2023-03-27 DIAGNOSIS — Z01.419 WELL WOMAN EXAM: ICD-10-CM

## 2023-03-27 DIAGNOSIS — I10 WHITE COAT SYNDROME WITH DIAGNOSIS OF HYPERTENSION: ICD-10-CM

## 2023-03-27 DIAGNOSIS — Z01.419 PAP SMEAR, LOW-RISK: Primary | ICD-10-CM

## 2023-03-27 DIAGNOSIS — E11.00 TYPE 2 DIABETES MELLITUS WITH HYPEROSMOLARITY WITHOUT COMA, WITHOUT LONG-TERM CURRENT USE OF INSULIN: ICD-10-CM

## 2023-03-27 NOTE — PROGRESS NOTES
GYN Annual Exam     CC- Here for annual exam   Chief Complaint   Patient presents with   • Gynecologic Exam     ANNUAL          Abigail Hines is a 63 y.o.  female who presents for annual well woman exam. No LMP recorded. Patient is postmenopausal.    Problems in addition to need for annual: none    HPI: History of Present Illness    PMHX:  Patient Active Problem List   Diagnosis   • Infected sebaceous cyst   • Type 2 diabetes mellitus (HCC)   • Chronic anxiety   • White coat syndrome with diagnosis of hypertension   • Osteopenia, senile   ; otherwise none    OB History        1    Para   1    Term   1            AB        Living   1       SAB        IAB        Ectopic        Molar        Multiple        Live Births   1          Obstetric Comments   First delivery for Dr. Jacob in private practice.                Past Medical History:   Diagnosis Date   • Breast nodule     Had calcium deposits on her first MG   • Diabetes mellitus (HCC)     Type 2 DM   • History of Papanicolaou smear of cervix      Never had an abnormal Pap smear.   Pap smear Hx:  [id number], normal yearly Paps.  , neg pap smear, neg HR-HPV.    • Hyperlipidemia     Never had high cholesterol, started simvastatin because of Diabetes.   • Hypertension    • Kidney stones     Intermittantly for the last 10 years, but none for last couple of years.   • Menopausal state    • Retinal detachment     Twice, 2008 & 2010.       Past Surgical History:   Procedure Laterality Date   • CATARACT EXTRACTION, BILATERAL     •  SECTION      CPD, Macrosomic.   • CHOLECYSTECTOMY      Non functional GB.   • COLONOSCOPY      Dr. Hogan  normal repeat in five years (hx of colon polyps).  Verify dates   • EXPLORATORY LAPAROTOMY     • TUBAL ABDOMINAL LIGATION           Current Outpatient Medications:   •  Cholecalciferol (VITAMIN D) 1000 UNITS tablet, Take 5,000 Units by mouth., Disp: , Rfl:   •   "lisinopril (PRINIVIL,ZESTRIL) 5 MG tablet, , Disp: , Rfl:   •  metFORMIN (GLUCOPHAGE) 1000 MG tablet, , Disp: , Rfl:   •  simvastatin (ZOCOR) 20 MG tablet, Take 20 mg by mouth every night., Disp: , Rfl:   •  spironolactone (ALDACTONE) 50 MG tablet, Take 50 mg by mouth daily., Disp: , Rfl:     Allergies   Allergen Reactions   • Penicillins Rash       Social History     Tobacco Use   • Smoking status: Never   • Smokeless tobacco: Never   Substance Use Topics   • Alcohol use: No   • Drug use: No       Abigail Hines  reports that she has never smoked. She has never used smokeless tobacco..       Family History   Problem Relation Age of Onset   • Breast cancer Mother 55        X2 ( 2 SEPARATE CANCERS).   • Dementia Father         RAPID ONSET AND VERY PROGRESSIVE.   • No Known Problems Sister    • No Known Problems Brother    • Anxiety disorder Son    • Osteoporosis Maternal Grandmother    • Breast cancer Paternal Grandmother 90          OF OLD AGE   • Osteoporosis Maternal Aunt    • Breast cancer Maternal Aunt 55   • Breast cancer Paternal Aunt    • Heart disease Maternal Grandfather    • Lung cancer Paternal Grandfather    • BRCA 1/2 Neg Hx    • Colon cancer Neg Hx    • Endometrial cancer Neg Hx    • Ovarian cancer Neg Hx        Review of Systems    Patient reports that she is not currently experiencing any symptoms of urinary incontinence.      noTESTED FOR CHLAMYDIA?    EXAM:  /82   Ht 154.9 cm (61\")   Wt 71.2 kg (157 lb)   BMI 29.66 kg/m²     Labs:   Lab Results (last 24 hours)     ** No results found for the last 24 hours. **          Physical Exam  Vitals and nursing note reviewed. Exam conducted with a chaperone present.   Constitutional:       General: She is not in acute distress.     Appearance: She is well-developed. She is not diaphoretic.   HENT:      Head: Normocephalic and atraumatic.      Nose: Nose normal.   Eyes:      Extraocular Movements: Extraocular movements intact. "   Cardiovascular:      Rate and Rhythm: Normal rate.   Pulmonary:      Effort: Pulmonary effort is normal.   Chest:   Breasts:     Breasts are symmetrical.      Right: Normal. No mass, nipple discharge, skin change or tenderness.      Left: Normal. No mass, nipple discharge, skin change or tenderness.   Abdominal:      General: There is no distension.      Palpations: Abdomen is soft. There is no mass.      Tenderness: There is no abdominal tenderness. There is no guarding.      Hernia: There is no hernia in the left inguinal area or right inguinal area.   Genitourinary:     General: Normal vulva.      Exam position: Lithotomy position.      Pubic Area: No rash.       Labia:         Right: No rash, tenderness, lesion or injury.         Left: No rash, tenderness, lesion or injury.       Urethra: No prolapse, urethral swelling or urethral lesion.      Vagina: Normal. No signs of injury and foreign body. No vaginal discharge, erythema, tenderness, bleeding, lesions or prolapsed vaginal walls.      Cervix: Normal. No eversion.      Uterus: Normal. Not deviated, not enlarged, not fixed, not tender and no uterine prolapse.       Adnexa: Right adnexa normal and left adnexa normal.        Right: No mass, tenderness or fullness.          Left: No mass, tenderness or fullness.        Rectum: No anal fissure or external hemorrhoid.   Musculoskeletal:         General: No tenderness or deformity. Normal range of motion.      Cervical back: Normal range of motion.   Lymphadenopathy:      Upper Body:      Right upper body: No axillary adenopathy.      Left upper body: No axillary adenopathy.      Lower Body: No right inguinal adenopathy. No left inguinal adenopathy.   Skin:     General: Skin is warm and dry.      Coloration: Skin is not pale.      Findings: No erythema or rash.   Neurological:      Mental Status: She is alert and oriented to person, place, and time.   Psychiatric:         Behavior: Behavior normal.         Thought  Content: Thought content normal.         Judgment: Judgment normal.            As part of wellness and prevention, the following topics were discussed with the patient: healthy weight, substance abuse/misuse, mental health, encouraging self breast exam, and other counseling and guidance done:  Nutrition, physical activity, healthy weight, injury prevention, misuse of tobacco, alcohol and drugs, sexual behavior and STDs, contraception, dental health, mental health, immunizations breast cancer screening and exams.    I saw the patient with a face mask, gloves and eye protection  The patient herself was masked.  Social distancing was observed as appropriate. All COVID precautions observed.  Pt encouraged to receive COVID vaccine if she hadn't already done this.     Assessment     1) GYN annual well woman exam.   2) PAP done today? Yes  3) problems addressed: none    MAMMOGRAM UP TO DATE IF AGE APPROPRIATE?  Yes  (if no, pt encouraged to schedule; risks of undiagnosed breast cancer reviewed with pt if she does not have a mammogram)    COLONOSCOPY UP TO DATE IF AGE APPROPRIATE? Yes  (if no, risks of undiagnosed colon cancer reviewed with pt if she fails to have the colonoscopy)    Fhx breast cancer? No    Fhx ovarian cancer? No    Fhx colon cancer? No    Invitae testing offered? No    Advance Care Planning   ACP discussion was held with the patient during this visit.              Plan       Follow up prn or one year.    Pt instructed to call for results of any testing done today and that failure to call if she has not heard from us could result in inadequate treatment.  Pt verbalized her understanding.     Diagnoses and all orders for this visit:    1. Pap smear, low-risk (Primary)  -     IGP, Apt HPV,rfx 16 / 18,45    2. White coat syndrome with diagnosis of hypertension    3. Type 2 diabetes mellitus with hyperosmolarity without coma, without long-term current use of insulin (HCC)    4. Chronic anxiety    5.  Osteopenia, senile  -     DEXA Bone Density Axial; Future    6. Screening mammogram for breast cancer  -     Mammo Screening Digital Tomosynthesis Bilateral With CAD; Future    7. Screening for colon cancer  -     Ambulatory Referral For Screening Colonoscopy    8. Well woman exam  -     POC Urinalysis Dipstick  -     IGP, Apt HPV,rfx 16 / 18,45    9. Screening for human papillomavirus  -     IGP, Apt HPV,rfx 16 / 18,45        RTO Return in about 1 year (around 3/27/2024) for Annual physical.        Kirill Bosch MD  03/29/23  21:56 EDT

## 2023-03-31 LAB
CYTOLOGIST CVX/VAG CYTO: NORMAL
CYTOLOGY CVX/VAG DOC CYTO: NORMAL
CYTOLOGY CVX/VAG DOC THIN PREP: NORMAL
DX ICD CODE: NORMAL
HIV 1 & 2 AB SER-IMP: NORMAL
HPV I/H RISK 4 DNA CVX QL PROBE+SIG AMP: NEGATIVE
OTHER STN SPEC: NORMAL
STAT OF ADQ CVX/VAG CYTO-IMP: NORMAL

## 2023-04-17 ENCOUNTER — HOSPITAL ENCOUNTER (OUTPATIENT)
Dept: MAMMOGRAPHY | Facility: HOSPITAL | Age: 63
Discharge: HOME | End: 2023-04-17
Admitting: OBSTETRICS & GYNECOLOGY
Payer: COMMERCIAL

## 2023-04-17 DIAGNOSIS — Z12.31 SCREENING MAMMOGRAM FOR BREAST CANCER: ICD-10-CM

## 2023-04-17 PROCEDURE — 77067 SCR MAMMO BI INCL CAD: CPT

## 2023-04-17 PROCEDURE — 77063 BREAST TOMOSYNTHESIS BI: CPT

## 2023-07-07 PROBLEM — M85.80 OSTEOPENIA: Status: ACTIVE | Noted: 2023-07-07

## 2023-09-08 ENCOUNTER — LAB (OUTPATIENT)
Dept: LAB | Facility: HOSPITAL | Age: 63
End: 2023-09-08
Payer: MEDICARE

## 2023-09-08 ENCOUNTER — TRANSCRIBE ORDERS (OUTPATIENT)
Dept: ADMINISTRATIVE | Facility: HOSPITAL | Age: 63
End: 2023-09-08
Payer: MEDICARE

## 2023-09-08 DIAGNOSIS — E78.5 HYPERLIPIDEMIA, UNSPECIFIED HYPERLIPIDEMIA TYPE: ICD-10-CM

## 2023-09-08 DIAGNOSIS — I10 ESSENTIAL HYPERTENSION, BENIGN: ICD-10-CM

## 2023-09-08 DIAGNOSIS — E11.9 DIABETES MELLITUS WITHOUT COMPLICATION: ICD-10-CM

## 2023-09-08 DIAGNOSIS — E78.5 HYPERLIPIDEMIA, UNSPECIFIED HYPERLIPIDEMIA TYPE: Primary | ICD-10-CM

## 2023-09-08 LAB
ALBUMIN SERPL-MCNC: 3.9 G/DL (ref 3.5–5.2)
ALBUMIN UR-MCNC: <1.2 MG/DL
ALBUMIN/GLOB SERPL: 2 G/DL
ALP SERPL-CCNC: 45 U/L (ref 39–117)
ALT SERPL W P-5'-P-CCNC: 31 U/L (ref 1–33)
ANION GAP SERPL CALCULATED.3IONS-SCNC: 9.7 MMOL/L (ref 5–15)
AST SERPL-CCNC: 24 U/L (ref 1–32)
BILIRUB SERPL-MCNC: 0.5 MG/DL (ref 0–1.2)
BILIRUB UR QL STRIP: NEGATIVE
BUN SERPL-MCNC: 8 MG/DL (ref 8–23)
BUN/CREAT SERPL: 10 (ref 7–25)
CALCIUM SPEC-SCNC: 9.1 MG/DL (ref 8.6–10.5)
CHLORIDE SERPL-SCNC: 99 MMOL/L (ref 98–107)
CHOLEST SERPL-MCNC: 114 MG/DL (ref 0–200)
CLARITY UR: CLEAR
CO2 SERPL-SCNC: 26.3 MMOL/L (ref 22–29)
COLOR UR: YELLOW
CREAT SERPL-MCNC: 0.8 MG/DL (ref 0.57–1)
EGFRCR SERPLBLD CKD-EPI 2021: 81.9 ML/MIN/1.73
GLOBULIN UR ELPH-MCNC: 2 GM/DL
GLUCOSE SERPL-MCNC: 113 MG/DL (ref 65–99)
GLUCOSE UR STRIP-MCNC: NEGATIVE MG/DL
HBA1C MFR BLD: 6.7 % (ref 4.8–5.6)
HDLC SERPL-MCNC: 44 MG/DL (ref 40–60)
HGB UR QL STRIP.AUTO: NEGATIVE
KETONES UR QL STRIP: NEGATIVE
LDLC SERPL CALC-MCNC: 54 MG/DL (ref 0–100)
LDLC/HDLC SERPL: 1.23 {RATIO}
LEUKOCYTE ESTERASE UR QL STRIP.AUTO: NEGATIVE
NITRITE UR QL STRIP: NEGATIVE
PH UR STRIP.AUTO: 7.5 [PH] (ref 5–8)
POTASSIUM SERPL-SCNC: 4.5 MMOL/L (ref 3.5–5.2)
PROT SERPL-MCNC: 5.9 G/DL (ref 6–8.5)
PROT UR QL STRIP: NEGATIVE
SODIUM SERPL-SCNC: 135 MMOL/L (ref 136–145)
SP GR UR STRIP: 1.01 (ref 1–1.03)
TRIGL SERPL-MCNC: 79 MG/DL (ref 0–150)
UROBILINOGEN UR QL STRIP: NORMAL
VLDLC SERPL-MCNC: 16 MG/DL (ref 5–40)

## 2023-09-08 PROCEDURE — 81003 URINALYSIS AUTO W/O SCOPE: CPT

## 2023-09-08 PROCEDURE — 83036 HEMOGLOBIN GLYCOSYLATED A1C: CPT

## 2023-09-08 PROCEDURE — 82043 UR ALBUMIN QUANTITATIVE: CPT

## 2023-09-08 PROCEDURE — 36415 COLL VENOUS BLD VENIPUNCTURE: CPT

## 2023-09-08 PROCEDURE — 80053 COMPREHEN METABOLIC PANEL: CPT

## 2023-09-08 PROCEDURE — 80061 LIPID PANEL: CPT

## 2024-01-05 ENCOUNTER — TRANSCRIBE ORDERS (OUTPATIENT)
Dept: ADMINISTRATIVE | Facility: HOSPITAL | Age: 64
End: 2024-01-05
Payer: MEDICARE

## 2024-01-05 ENCOUNTER — LAB (OUTPATIENT)
Dept: LAB | Facility: HOSPITAL | Age: 64
End: 2024-01-05
Payer: MEDICARE

## 2024-01-05 DIAGNOSIS — E78.5 HYPERLIPIDEMIA, UNSPECIFIED HYPERLIPIDEMIA TYPE: ICD-10-CM

## 2024-01-05 DIAGNOSIS — I10 ESSENTIAL HYPERTENSION, MALIGNANT: ICD-10-CM

## 2024-01-05 DIAGNOSIS — E55.9 VITAMIN D DEFICIENCY: ICD-10-CM

## 2024-01-05 DIAGNOSIS — E11.9 DIABETES MELLITUS WITHOUT COMPLICATION: ICD-10-CM

## 2024-01-05 DIAGNOSIS — E11.9 DIABETES MELLITUS WITHOUT COMPLICATION: Primary | ICD-10-CM

## 2024-01-05 LAB
25(OH)D3 SERPL-MCNC: 79.9 NG/ML (ref 30–100)
ALBUMIN SERPL-MCNC: 4.1 G/DL (ref 3.5–5.2)
ALBUMIN/GLOB SERPL: 2.3 G/DL
ALP SERPL-CCNC: 54 U/L (ref 39–117)
ALT SERPL W P-5'-P-CCNC: 37 U/L (ref 1–33)
ANION GAP SERPL CALCULATED.3IONS-SCNC: 9.4 MMOL/L (ref 5–15)
AST SERPL-CCNC: 25 U/L (ref 1–32)
BASOPHILS # BLD AUTO: 0.06 10*3/MM3 (ref 0–0.2)
BASOPHILS NFR BLD AUTO: 1 % (ref 0–1.5)
BILIRUB SERPL-MCNC: 0.5 MG/DL (ref 0–1.2)
BILIRUB UR QL STRIP: NEGATIVE
BUN SERPL-MCNC: 9 MG/DL (ref 8–23)
BUN/CREAT SERPL: 12.9 (ref 7–25)
CALCIUM SPEC-SCNC: 9.3 MG/DL (ref 8.6–10.5)
CHLORIDE SERPL-SCNC: 100 MMOL/L (ref 98–107)
CHOLEST SERPL-MCNC: 127 MG/DL (ref 0–200)
CLARITY UR: CLEAR
CO2 SERPL-SCNC: 26.6 MMOL/L (ref 22–29)
COLOR UR: YELLOW
CREAT SERPL-MCNC: 0.7 MG/DL (ref 0.57–1)
DEPRECATED RDW RBC AUTO: 39.5 FL (ref 37–54)
EGFRCR SERPLBLD CKD-EPI 2021: 96.1 ML/MIN/1.73
EOSINOPHIL # BLD AUTO: 0.53 10*3/MM3 (ref 0–0.4)
EOSINOPHIL NFR BLD AUTO: 9.3 % (ref 0.3–6.2)
ERYTHROCYTE [DISTWIDTH] IN BLOOD BY AUTOMATED COUNT: 11.5 % (ref 12.3–15.4)
FOLATE SERPL-MCNC: >20 NG/ML (ref 4.78–24.2)
GLOBULIN UR ELPH-MCNC: 1.8 GM/DL
GLUCOSE SERPL-MCNC: 97 MG/DL (ref 65–99)
GLUCOSE UR STRIP-MCNC: NEGATIVE MG/DL
HBA1C MFR BLD: 6.1 % (ref 4.8–5.6)
HCT VFR BLD AUTO: 37.4 % (ref 34–46.6)
HDLC SERPL-MCNC: 58 MG/DL (ref 40–60)
HGB BLD-MCNC: 12.9 G/DL (ref 12–15.9)
HGB UR QL STRIP.AUTO: NEGATIVE
IMM GRANULOCYTES # BLD AUTO: 0.01 10*3/MM3 (ref 0–0.05)
IMM GRANULOCYTES NFR BLD AUTO: 0.2 % (ref 0–0.5)
KETONES UR QL STRIP: NEGATIVE
LDLC SERPL CALC-MCNC: 55 MG/DL (ref 0–100)
LDLC/HDLC SERPL: 0.95 {RATIO}
LEUKOCYTE ESTERASE UR QL STRIP.AUTO: NEGATIVE
LYMPHOCYTES # BLD AUTO: 1.97 10*3/MM3 (ref 0.7–3.1)
LYMPHOCYTES NFR BLD AUTO: 34.4 % (ref 19.6–45.3)
MCH RBC QN AUTO: 32.5 PG (ref 26.6–33)
MCHC RBC AUTO-ENTMCNC: 34.5 G/DL (ref 31.5–35.7)
MCV RBC AUTO: 94.2 FL (ref 79–97)
MONOCYTES # BLD AUTO: 0.51 10*3/MM3 (ref 0.1–0.9)
MONOCYTES NFR BLD AUTO: 8.9 % (ref 5–12)
NEUTROPHILS NFR BLD AUTO: 2.64 10*3/MM3 (ref 1.7–7)
NEUTROPHILS NFR BLD AUTO: 46.2 % (ref 42.7–76)
NITRITE UR QL STRIP: NEGATIVE
NRBC BLD AUTO-RTO: 0 /100 WBC (ref 0–0.2)
PH UR STRIP.AUTO: 6 [PH] (ref 5–8)
PLATELET # BLD AUTO: 454 10*3/MM3 (ref 140–450)
PMV BLD AUTO: 10.3 FL (ref 6–12)
POTASSIUM SERPL-SCNC: 4.1 MMOL/L (ref 3.5–5.2)
PROT SERPL-MCNC: 5.9 G/DL (ref 6–8.5)
PROT UR QL STRIP: NEGATIVE
RBC # BLD AUTO: 3.97 10*6/MM3 (ref 3.77–5.28)
SODIUM SERPL-SCNC: 136 MMOL/L (ref 136–145)
SP GR UR STRIP: 1.02 (ref 1–1.03)
TRIGL SERPL-MCNC: 70 MG/DL (ref 0–150)
TSH SERPL DL<=0.05 MIU/L-ACNC: 2.48 UIU/ML (ref 0.27–4.2)
UROBILINOGEN UR QL STRIP: NORMAL
VIT B12 BLD-MCNC: 716 PG/ML (ref 211–946)
VLDLC SERPL-MCNC: 14 MG/DL (ref 5–40)
WBC NRBC COR # BLD AUTO: 5.72 10*3/MM3 (ref 3.4–10.8)

## 2024-01-05 PROCEDURE — 80053 COMPREHEN METABOLIC PANEL: CPT

## 2024-01-05 PROCEDURE — 82306 VITAMIN D 25 HYDROXY: CPT

## 2024-01-05 PROCEDURE — 83036 HEMOGLOBIN GLYCOSYLATED A1C: CPT

## 2024-01-05 PROCEDURE — 82607 VITAMIN B-12: CPT

## 2024-01-05 PROCEDURE — 82746 ASSAY OF FOLIC ACID SERUM: CPT

## 2024-01-05 PROCEDURE — 85025 COMPLETE CBC W/AUTO DIFF WBC: CPT

## 2024-01-05 PROCEDURE — 80061 LIPID PANEL: CPT

## 2024-01-05 PROCEDURE — 84443 ASSAY THYROID STIM HORMONE: CPT

## 2024-01-05 PROCEDURE — 81003 URINALYSIS AUTO W/O SCOPE: CPT

## 2024-01-05 PROCEDURE — 36415 COLL VENOUS BLD VENIPUNCTURE: CPT

## 2024-04-11 NOTE — PROGRESS NOTES
GYN Annual Exam     CC- Here for annual exam   Chief Complaint   Patient presents with    Gynecologic Exam     ANNUAL: 3/27/23   MM23  COLON: 19          Abigail Hines is a 64 y.o.  female who presents for annual well woman exam. No LMP recorded. Patient is postmenopausal.    Problems in addition to need for annual: none    HPI: History of Present Illness    PMHX:    * No active hospital problems. *  ; otherwise none    OB History          1    Para   1    Term   1            AB        Living   1         SAB        IAB        Ectopic        Molar        Multiple        Live Births   1          Obstetric Comments   First delivery for Dr. Jacob in private practice.                  Past Medical History:   Diagnosis Date    Breast nodule     Had calcium deposits on her first MG    Diabetes mellitus     Type 2 DM    History of Papanicolaou smear of cervix      Never had an abnormal Pap smear.   Pap smear Hx:  [id number], normal yearly Paps.  , neg pap smear, neg HR-HPV.     Hyperlipidemia     Never had high cholesterol, started simvastatin because of Diabetes.    Hypertension     Kidney stones     Intermittantly for the last 10 years, but none for last couple of years.    Menopausal state     Retinal detachment 2010    Twice, 2008 & .       Past Surgical History:   Procedure Laterality Date    CATARACT EXTRACTION, BILATERAL       SECTION      CPD, Macrosomic.    CHOLECYSTECTOMY      Non functional GB.    COLONOSCOPY      Dr. Hogan  normal repeat in five years (hx of colon polyps).  Verify dates    EXPLORATORY LAPAROTOMY      TUBAL ABDOMINAL LIGATION           Current Outpatient Medications:     lisinopril (PRINIVIL,ZESTRIL) 20 MG tablet, , Disp: , Rfl:     metFORMIN (GLUCOPHAGE) 500 MG tablet, , Disp: , Rfl:     Cholecalciferol (VITAMIN D) 1000 UNITS tablet, Take 5,000 Units by mouth., Disp: , Rfl:     metFORMIN (GLUCOPHAGE) 1000 MG  "tablet, , Disp: , Rfl:     simvastatin (ZOCOR) 20 MG tablet, Take 20 mg by mouth every night., Disp: , Rfl:     Allergies   Allergen Reactions    Penicillins Rash, Hives and Itching       Social History     Tobacco Use    Smoking status: Never    Smokeless tobacco: Never   Substance Use Topics    Alcohol use: No    Drug use: No       Abigail Hines  reports that she has never smoked. She has never used smokeless tobacco..       Family History   Problem Relation Age of Onset    Breast cancer Mother 55        X2 ( 2 SEPARATE CANCERS).    Dementia Father         RAPID ONSET AND VERY PROGRESSIVE.    No Known Problems Sister     No Known Problems Brother     Anxiety disorder Son     Osteoporosis Maternal Grandmother     Breast cancer Paternal Grandmother 90          OF OLD AGE    Osteoporosis Maternal Aunt     Breast cancer Maternal Aunt 55    Breast cancer Paternal Aunt     Heart disease Maternal Grandfather     Lung cancer Paternal Grandfather     BRCA 1/2 Neg Hx     Colon cancer Neg Hx     Endometrial cancer Neg Hx     Ovarian cancer Neg Hx        Review of Systems    Patient reports that she is not currently experiencing any symptoms of urinary incontinence.      noTESTED FOR CHLAMYDIA?    Gardisil indicated? (9-44yo) 0,2,6 months: no    EXAM:  /76   Ht 154.9 cm (61\")   Wt 67.5 kg (148 lb 12.8 oz)   BMI 28.12 kg/m²     Labs:   Lab Results (last 24 hours)       Procedure Component Value Units Date/Time    POC Urinalysis Dipstick [[id number]]  (Normal) Collected: 04/15/24 1001    Specimen: Urine Updated: 04/15/24 1002     Color Yellow     Clarity, UA Clear     Glucose, UA Negative mg/dL      Bilirubin Negative     Ketones, UA Negative     Specific Gravity  1.005     Blood, UA Negative     pH, Urine 5.0     Protein, POC Negative mg/dL      Urobilinogen, UA Normal     Leukocytes Negative     Nitrite, UA Negative            Physical Exam  Vitals and nursing note reviewed. Exam conducted with a chaperone " present.   Constitutional:       General: She is not in acute distress.     Appearance: She is well-developed. She is not diaphoretic.   HENT:      Head: Normocephalic and atraumatic.      Nose: Nose normal.   Eyes:      Extraocular Movements: Extraocular movements intact.   Cardiovascular:      Rate and Rhythm: Normal rate.   Pulmonary:      Effort: Pulmonary effort is normal.   Chest:   Breasts:     Breasts are symmetrical.      Right: Normal. No mass, nipple discharge, skin change or tenderness.      Left: Normal. No mass, nipple discharge, skin change or tenderness.   Abdominal:      General: There is no distension.      Palpations: Abdomen is soft. There is no mass.      Tenderness: There is no abdominal tenderness. There is no guarding.      Hernia: There is no hernia in the left inguinal area or right inguinal area.   Genitourinary:     General: Normal vulva.      Exam position: Lithotomy position.      Pubic Area: No rash.       Labia:         Right: No rash, tenderness, lesion or injury.         Left: No rash, tenderness, lesion or injury.       Urethra: No prolapse, urethral swelling or urethral lesion.      Vagina: Normal. No signs of injury and foreign body. No vaginal discharge, erythema, tenderness, bleeding, lesions or prolapsed vaginal walls.      Cervix: Normal. No cervical motion tenderness, discharge, friability, lesion, erythema, cervical bleeding or eversion.      Uterus: Normal. Not deviated, not enlarged, not fixed, not tender and no uterine prolapse.       Adnexa: Right adnexa normal and left adnexa normal.        Right: No mass, tenderness or fullness.          Left: No mass, tenderness or fullness.        Rectum: No anal fissure or external hemorrhoid.   Musculoskeletal:         General: No tenderness or deformity. Normal range of motion.      Cervical back: Normal range of motion.   Lymphadenopathy:      Upper Body:      Right upper body: No axillary adenopathy.      Left upper body: No  axillary adenopathy.      Lower Body: No right inguinal adenopathy. No left inguinal adenopathy.   Skin:     General: Skin is warm and dry.      Coloration: Skin is not pale.      Findings: No erythema or rash.   Neurological:      Mental Status: She is alert and oriented to person, place, and time.   Psychiatric:         Behavior: Behavior normal.         Thought Content: Thought content normal.         Judgment: Judgment normal.            As part of wellness and prevention, the following topics were discussed with the patient where appropriate: healthy weight, substance abuse/misuse, mental health, encouraging self breast exam, and other counseling and guidance done:  Nutrition, physical activity, healthy weight, injury prevention, misuse of tobacco, alcohol and drugs, sexual behavior and STDs, contraception, dental health, mental health, immunizations breast cancer screening and exams.    Assessment     1) GYN annual well woman exam.   2) PAP done today? Yes  3) problems addressed: none    MAMMOGRAM UP TO DATE IF AGE APPROPRIATE?  Yes  (if no, pt encouraged to schedule; risks of undiagnosed breast cancer reviewed with pt if she does not have a mammogram)    COLONOSCOPY UP TO DATE IF AGE APPROPRIATE? Yes  (if no, risks of undiagnosed colon cancer reviewed with pt if she fails to have the colonoscopy)    Advance Care Planning   ACP discussion was held with the patient during this visit.              Plan       Follow up prn or one year.    Pt instructed to call for results of any testing done today and that failure to call if she has not heard from us could result in inadequate treatment.  Pt verbalized her understanding.     Diagnoses and all orders for this visit:    1. Cervical smear, as part of routine gynecological examination (Primary)  -     Pap IG (Image Guided)    2. White coat syndrome with diagnosis of hypertension    3. Type 2 diabetes mellitus with hyperosmolarity without coma, without long-term  current use of insulin    4. Osteopenia, senile    5. Osteopenia of hip, unspecified laterality    6. Well woman exam  -     POC Urinalysis Dipstick  -     Pap IG (Image Guided)    7. Screening mammogram for breast cancer  -     Mammo Screening Digital Tomosynthesis Bilateral With CAD; Future        RTO Return in about 1 year (around 4/15/2025) for Annual physical.        Kirill Bosch MD  04/15/24  11:04 EDT

## 2024-04-15 ENCOUNTER — OFFICE VISIT (OUTPATIENT)
Dept: OBSTETRICS AND GYNECOLOGY | Facility: CLINIC | Age: 64
End: 2024-04-15
Payer: MEDICARE

## 2024-04-15 VITALS
SYSTOLIC BLOOD PRESSURE: 138 MMHG | HEIGHT: 61 IN | DIASTOLIC BLOOD PRESSURE: 76 MMHG | WEIGHT: 148.8 LBS | BODY MASS INDEX: 28.09 KG/M2

## 2024-04-15 DIAGNOSIS — Z12.31 SCREENING MAMMOGRAM FOR BREAST CANCER: ICD-10-CM

## 2024-04-15 DIAGNOSIS — M85.80 OSTEOPENIA, SENILE: ICD-10-CM

## 2024-04-15 DIAGNOSIS — E11.00 TYPE 2 DIABETES MELLITUS WITH HYPEROSMOLARITY WITHOUT COMA, WITHOUT LONG-TERM CURRENT USE OF INSULIN: ICD-10-CM

## 2024-04-15 DIAGNOSIS — I10 WHITE COAT SYNDROME WITH DIAGNOSIS OF HYPERTENSION: ICD-10-CM

## 2024-04-15 DIAGNOSIS — Z01.419 CERVICAL SMEAR, AS PART OF ROUTINE GYNECOLOGICAL EXAMINATION: Primary | ICD-10-CM

## 2024-04-15 DIAGNOSIS — Z01.419 WELL WOMAN EXAM: ICD-10-CM

## 2024-04-15 DIAGNOSIS — M85.859 OSTEOPENIA OF HIP, UNSPECIFIED LATERALITY: ICD-10-CM

## 2024-04-15 PROCEDURE — G0101 CA SCREEN;PELVIC/BREAST EXAM: HCPCS | Performed by: OBSTETRICS & GYNECOLOGY

## 2024-04-15 PROCEDURE — 3075F SYST BP GE 130 - 139MM HG: CPT | Performed by: OBSTETRICS & GYNECOLOGY

## 2024-04-15 PROCEDURE — 3078F DIAST BP <80 MM HG: CPT | Performed by: OBSTETRICS & GYNECOLOGY

## 2024-04-15 PROCEDURE — 81002 URINALYSIS NONAUTO W/O SCOPE: CPT | Performed by: OBSTETRICS & GYNECOLOGY

## 2024-04-15 RX ORDER — LISINOPRIL 20 MG/1
TABLET ORAL
COMMUNITY
Start: 2024-03-26

## 2024-04-17 LAB
CYTOLOGIST CVX/VAG CYTO: NORMAL
CYTOLOGY CVX/VAG DOC CYTO: NORMAL
CYTOLOGY CVX/VAG DOC THIN PREP: NORMAL
DX ICD CODE: NORMAL
Lab: NORMAL
OTHER STN SPEC: NORMAL
STAT OF ADQ CVX/VAG CYTO-IMP: NORMAL

## 2024-05-06 ENCOUNTER — HOSPITAL ENCOUNTER (OUTPATIENT)
Dept: MAMMOGRAPHY | Facility: HOSPITAL | Age: 64
Discharge: HOME | End: 2024-05-06
Payer: MEDICARE

## 2024-05-06 ENCOUNTER — PREP FOR SURGERY (OUTPATIENT)
Dept: SURGERY | Facility: SURGERY CENTER | Age: 64
End: 2024-05-06
Payer: MEDICARE

## 2024-05-06 ENCOUNTER — LAB (OUTPATIENT)
Dept: LAB | Facility: HOSPITAL | Age: 64
End: 2024-05-06
Payer: MEDICARE

## 2024-05-06 ENCOUNTER — TRANSCRIBE ORDERS (OUTPATIENT)
Dept: ADMINISTRATIVE | Facility: HOSPITAL | Age: 64
End: 2024-05-06
Payer: MEDICARE

## 2024-05-06 DIAGNOSIS — I10 HYPERTENSION, UNSPECIFIED TYPE: ICD-10-CM

## 2024-05-06 DIAGNOSIS — Z12.11 ENCOUNTER FOR SCREENING FOR MALIGNANT NEOPLASM OF COLON: Primary | ICD-10-CM

## 2024-05-06 DIAGNOSIS — E11.9 DIABETES MELLITUS WITHOUT COMPLICATION: Primary | ICD-10-CM

## 2024-05-06 DIAGNOSIS — Z86.010 PERSONAL HISTORY OF COLONIC POLYPS: ICD-10-CM

## 2024-05-06 DIAGNOSIS — Z12.31 SCREENING MAMMOGRAM FOR BREAST CANCER: ICD-10-CM

## 2024-05-06 DIAGNOSIS — E11.9 DIABETES MELLITUS WITHOUT COMPLICATION: ICD-10-CM

## 2024-05-06 DIAGNOSIS — E78.5 HYPERLIPIDEMIA, UNSPECIFIED HYPERLIPIDEMIA TYPE: ICD-10-CM

## 2024-05-06 LAB
ALBUMIN SERPL-MCNC: 4.2 G/DL (ref 3.5–5.2)
ALBUMIN/GLOB SERPL: 2.2 G/DL
ALP SERPL-CCNC: 51 U/L (ref 39–117)
ALT SERPL W P-5'-P-CCNC: 28 U/L (ref 1–33)
ANION GAP SERPL CALCULATED.3IONS-SCNC: 10 MMOL/L (ref 5–15)
AST SERPL-CCNC: 21 U/L (ref 1–32)
BILIRUB SERPL-MCNC: 0.6 MG/DL (ref 0–1.2)
BUN SERPL-MCNC: 8 MG/DL (ref 8–23)
BUN/CREAT SERPL: 11 (ref 7–25)
CALCIUM SPEC-SCNC: 9.2 MG/DL (ref 8.6–10.5)
CHLORIDE SERPL-SCNC: 96 MMOL/L (ref 98–107)
CHOLEST SERPL-MCNC: 111 MG/DL (ref 0–200)
CO2 SERPL-SCNC: 28 MMOL/L (ref 22–29)
CREAT SERPL-MCNC: 0.73 MG/DL (ref 0.57–1)
EGFRCR SERPLBLD CKD-EPI 2021: 90.8 ML/MIN/1.73
GLOBULIN UR ELPH-MCNC: 1.9 GM/DL
GLUCOSE SERPL-MCNC: 104 MG/DL (ref 65–99)
HBA1C MFR BLD: 5.8 % (ref 4.8–5.6)
HDLC SERPL-MCNC: 55 MG/DL (ref 40–60)
LDLC SERPL CALC-MCNC: 41 MG/DL (ref 0–100)
LDLC/HDLC SERPL: 0.76 {RATIO}
POTASSIUM SERPL-SCNC: 4.1 MMOL/L (ref 3.5–5.2)
PROT SERPL-MCNC: 6.1 G/DL (ref 6–8.5)
SODIUM SERPL-SCNC: 134 MMOL/L (ref 136–145)
TRIGL SERPL-MCNC: 72 MG/DL (ref 0–150)
VLDLC SERPL-MCNC: 15 MG/DL (ref 5–40)

## 2024-05-06 PROCEDURE — 77067 SCR MAMMO BI INCL CAD: CPT

## 2024-05-06 PROCEDURE — 80053 COMPREHEN METABOLIC PANEL: CPT

## 2024-05-06 PROCEDURE — 36415 COLL VENOUS BLD VENIPUNCTURE: CPT

## 2024-05-06 PROCEDURE — 83036 HEMOGLOBIN GLYCOSYLATED A1C: CPT

## 2024-05-06 PROCEDURE — 80061 LIPID PANEL: CPT

## 2024-05-06 PROCEDURE — 77063 BREAST TOMOSYNTHESIS BI: CPT

## 2024-05-07 RX ORDER — SODIUM CHLORIDE 0.9 % (FLUSH) 0.9 %
10 SYRINGE (ML) INJECTION AS NEEDED
OUTPATIENT
Start: 2024-05-07

## 2024-05-07 RX ORDER — SODIUM CHLORIDE, SODIUM LACTATE, POTASSIUM CHLORIDE, CALCIUM CHLORIDE 600; 310; 30; 20 MG/100ML; MG/100ML; MG/100ML; MG/100ML
30 INJECTION, SOLUTION INTRAVENOUS CONTINUOUS PRN
OUTPATIENT
Start: 2024-05-07

## 2024-05-07 RX ORDER — SODIUM CHLORIDE 0.9 % (FLUSH) 0.9 %
3 SYRINGE (ML) INJECTION EVERY 12 HOURS SCHEDULED
OUTPATIENT
Start: 2024-05-07

## 2024-05-09 PROBLEM — Z86.010 PERSONAL HISTORY OF COLONIC POLYPS: Status: ACTIVE | Noted: 2024-05-06

## 2024-05-09 PROBLEM — Z12.11 ENCOUNTER FOR SCREENING FOR MALIGNANT NEOPLASM OF COLON: Status: ACTIVE | Noted: 2024-05-06

## 2024-05-09 PROBLEM — Z86.0100 PERSONAL HISTORY OF COLONIC POLYPS: Status: ACTIVE | Noted: 2024-05-06

## 2024-06-12 ENCOUNTER — TELEPHONE (OUTPATIENT)
Dept: GASTROENTEROLOGY | Facility: CLINIC | Age: 64
End: 2024-06-12
Payer: MEDICARE

## 2024-06-12 NOTE — TELEPHONE ENCOUNTER
Hub staff attempted to follow warm transfer process and was unsuccessful     Caller: Abigail Hines    Relationship to patient: Self    Best call back number: [phone]    Patient is needing: PATIENT NEEDS TO CANCEL PROCEDURE 6-19-24 UNSURE IF MEDICARE WILL COVER AND PATIENT WILL BE OUT UNTIL LATER THIS AFTER NOON IF YOU NEED TO CALL HER

## 2024-06-12 NOTE — TELEPHONE ENCOUNTER
Pt called and cancelled her cls. Her pcp called her in the cologuard and it was negative. She will call us back if she has any issues.

## 2024-09-04 ENCOUNTER — TRANSCRIBE ORDERS (OUTPATIENT)
Dept: ADMINISTRATIVE | Facility: HOSPITAL | Age: 64
End: 2024-09-04
Payer: MEDICARE

## 2024-09-04 ENCOUNTER — LAB (OUTPATIENT)
Dept: LAB | Facility: HOSPITAL | Age: 64
End: 2024-09-04
Payer: MEDICARE

## 2024-09-04 DIAGNOSIS — I10 ESSENTIAL HYPERTENSION, BENIGN: ICD-10-CM

## 2024-09-04 DIAGNOSIS — E11.9 DIABETES MELLITUS WITHOUT COMPLICATION: ICD-10-CM

## 2024-09-04 DIAGNOSIS — E78.5 HYPERLIPIDEMIA, UNSPECIFIED HYPERLIPIDEMIA TYPE: ICD-10-CM

## 2024-09-04 DIAGNOSIS — E78.5 HYPERLIPIDEMIA, UNSPECIFIED HYPERLIPIDEMIA TYPE: Primary | ICD-10-CM

## 2024-09-04 LAB
ALBUMIN SERPL-MCNC: 3.9 G/DL (ref 3.5–5.2)
ALBUMIN/GLOB SERPL: 1.9 G/DL
ALP SERPL-CCNC: 53 U/L (ref 39–117)
ALT SERPL W P-5'-P-CCNC: 35 U/L (ref 1–33)
ANION GAP SERPL CALCULATED.3IONS-SCNC: 9.7 MMOL/L (ref 5–15)
AST SERPL-CCNC: 27 U/L (ref 1–32)
BILIRUB SERPL-MCNC: 0.6 MG/DL (ref 0–1.2)
BUN SERPL-MCNC: 6 MG/DL (ref 8–23)
BUN/CREAT SERPL: 9.1 (ref 7–25)
CALCIUM SPEC-SCNC: 9.4 MG/DL (ref 8.6–10.5)
CHLORIDE SERPL-SCNC: 95 MMOL/L (ref 98–107)
CHOLEST SERPL-MCNC: 119 MG/DL (ref 0–200)
CO2 SERPL-SCNC: 29.3 MMOL/L (ref 22–29)
CREAT SERPL-MCNC: 0.66 MG/DL (ref 0.57–1)
EGFRCR SERPLBLD CKD-EPI 2021: 96.9 ML/MIN/1.73
GLOBULIN UR ELPH-MCNC: 2.1 GM/DL
GLUCOSE SERPL-MCNC: 95 MG/DL (ref 65–99)
HBA1C MFR BLD: 5.8 % (ref 4.8–5.6)
HDLC SERPL-MCNC: 51 MG/DL (ref 40–60)
LDLC SERPL CALC-MCNC: 52 MG/DL (ref 0–100)
LDLC/HDLC SERPL: 1.03 {RATIO}
POTASSIUM SERPL-SCNC: 4.3 MMOL/L (ref 3.5–5.2)
PROT SERPL-MCNC: 6 G/DL (ref 6–8.5)
SODIUM SERPL-SCNC: 134 MMOL/L (ref 136–145)
TRIGL SERPL-MCNC: 78 MG/DL (ref 0–150)
VLDLC SERPL-MCNC: 16 MG/DL (ref 5–40)

## 2024-09-04 PROCEDURE — 36415 COLL VENOUS BLD VENIPUNCTURE: CPT

## 2024-09-04 PROCEDURE — 83036 HEMOGLOBIN GLYCOSYLATED A1C: CPT

## 2024-09-04 PROCEDURE — 80053 COMPREHEN METABOLIC PANEL: CPT

## 2024-09-04 PROCEDURE — 80061 LIPID PANEL: CPT

## 2024-10-22 RX ORDER — ESTRADIOL 0.1 MG/G
CREAM VAGINAL
COMMUNITY
End: 2024-11-04

## 2024-10-22 RX ORDER — LISINOPRIL 40 MG/1
40 TABLET ORAL DAILY
COMMUNITY
End: 2025-01-15 | Stop reason: SDUPTHER

## 2024-10-22 RX ORDER — DIAZEPAM 2 MG/1
2 TABLET ORAL DAILY PRN
COMMUNITY
End: 2024-11-04

## 2025-01-10 ENCOUNTER — TRANSCRIBE ORDERS (OUTPATIENT)
Dept: INTERNAL MEDICINE | Facility: CLINIC | Age: 65
End: 2025-01-10
Payer: MEDICARE

## 2025-01-10 ENCOUNTER — LAB (OUTPATIENT)
Dept: LAB | Facility: HOSPITAL | Age: 65
End: 2025-01-10
Payer: MEDICARE

## 2025-01-10 DIAGNOSIS — E78.5 HYPERLIPIDEMIA, UNSPECIFIED HYPERLIPIDEMIA TYPE: ICD-10-CM

## 2025-01-10 DIAGNOSIS — E78.5 HYPERLIPIDEMIA, UNSPECIFIED HYPERLIPIDEMIA TYPE: Primary | ICD-10-CM

## 2025-01-10 DIAGNOSIS — E03.9 ACQUIRED HYPOTHYROIDISM: ICD-10-CM

## 2025-01-10 DIAGNOSIS — E11.9 DIABETES MELLITUS WITHOUT COMPLICATION: ICD-10-CM

## 2025-01-10 LAB
ALBUMIN SERPL-MCNC: 3.8 G/DL (ref 3.5–5.2)
ALBUMIN/GLOB SERPL: 1.6 G/DL
ALP SERPL-CCNC: 54 U/L (ref 39–117)
ALT SERPL W P-5'-P-CCNC: 30 U/L (ref 1–33)
ANION GAP SERPL CALCULATED.3IONS-SCNC: 8.9 MMOL/L (ref 5–15)
AST SERPL-CCNC: 27 U/L (ref 1–32)
BILIRUB SERPL-MCNC: 0.5 MG/DL (ref 0–1.2)
BUN SERPL-MCNC: 5 MG/DL (ref 8–23)
BUN/CREAT SERPL: 6.9 (ref 7–25)
CALCIUM SPEC-SCNC: 9.1 MG/DL (ref 8.6–10.5)
CHLORIDE SERPL-SCNC: 94 MMOL/L (ref 98–107)
CHOLEST SERPL-MCNC: 128 MG/DL (ref 0–200)
CO2 SERPL-SCNC: 28.1 MMOL/L (ref 22–29)
CREAT SERPL-MCNC: 0.72 MG/DL (ref 0.57–1)
EGFRCR SERPLBLD CKD-EPI 2021: 92.3 ML/MIN/1.73
GLOBULIN UR ELPH-MCNC: 2.4 GM/DL
GLUCOSE SERPL-MCNC: 86 MG/DL (ref 65–99)
HBA1C MFR BLD: 5.5 % (ref 4.8–5.6)
HDLC SERPL-MCNC: 54 MG/DL (ref 40–60)
LDLC SERPL CALC-MCNC: 51 MG/DL (ref 0–100)
LDLC/HDLC SERPL: 0.87 {RATIO}
POTASSIUM SERPL-SCNC: 4.1 MMOL/L (ref 3.5–5.2)
PROT SERPL-MCNC: 6.2 G/DL (ref 6–8.5)
SODIUM SERPL-SCNC: 131 MMOL/L (ref 136–145)
TRIGL SERPL-MCNC: 134 MG/DL (ref 0–150)
TSH SERPL DL<=0.05 MIU/L-ACNC: 2.28 UIU/ML (ref 0.27–4.2)
VLDLC SERPL-MCNC: 23 MG/DL (ref 5–40)

## 2025-01-10 PROCEDURE — 83036 HEMOGLOBIN GLYCOSYLATED A1C: CPT

## 2025-01-10 PROCEDURE — 80053 COMPREHEN METABOLIC PANEL: CPT

## 2025-01-10 PROCEDURE — 36415 COLL VENOUS BLD VENIPUNCTURE: CPT

## 2025-01-10 PROCEDURE — 84443 ASSAY THYROID STIM HORMONE: CPT

## 2025-01-10 PROCEDURE — 80061 LIPID PANEL: CPT

## 2025-01-15 ENCOUNTER — OFFICE VISIT (OUTPATIENT)
Dept: INTERNAL MEDICINE | Facility: CLINIC | Age: 65
End: 2025-01-15
Payer: MEDICARE

## 2025-01-15 VITALS
HEART RATE: 69 BPM | OXYGEN SATURATION: 99 % | SYSTOLIC BLOOD PRESSURE: 130 MMHG | RESPIRATION RATE: 16 BRPM | HEIGHT: 60 IN | DIASTOLIC BLOOD PRESSURE: 74 MMHG | TEMPERATURE: 98.1 F | BODY MASS INDEX: 28.47 KG/M2 | WEIGHT: 145 LBS

## 2025-01-15 DIAGNOSIS — F41.9 CHRONIC ANXIETY: ICD-10-CM

## 2025-01-15 DIAGNOSIS — Z00.00 HEALTHCARE MAINTENANCE: ICD-10-CM

## 2025-01-15 DIAGNOSIS — I10 PRIMARY HYPERTENSION: ICD-10-CM

## 2025-01-15 DIAGNOSIS — E11.65 TYPE 2 DIABETES MELLITUS WITH HYPERGLYCEMIA, WITHOUT LONG-TERM CURRENT USE OF INSULIN: Primary | ICD-10-CM

## 2025-01-15 DIAGNOSIS — E55.9 VITAMIN D DEFICIENCY: ICD-10-CM

## 2025-01-15 DIAGNOSIS — E78.5 DYSLIPIDEMIA: ICD-10-CM

## 2025-01-15 PROBLEM — Z86.0100 HX OF COLONIC POLYPS: Status: ACTIVE | Noted: 2018-10-05

## 2025-01-15 PROCEDURE — 99214 OFFICE O/P EST MOD 30 MIN: CPT | Performed by: INTERNAL MEDICINE

## 2025-01-15 PROCEDURE — 1160F RVW MEDS BY RX/DR IN RCRD: CPT | Performed by: INTERNAL MEDICINE

## 2025-01-15 PROCEDURE — 3044F HG A1C LEVEL LT 7.0%: CPT | Performed by: INTERNAL MEDICINE

## 2025-01-15 PROCEDURE — G2211 COMPLEX E/M VISIT ADD ON: HCPCS | Performed by: INTERNAL MEDICINE

## 2025-01-15 PROCEDURE — 1159F MED LIST DOCD IN RCRD: CPT | Performed by: INTERNAL MEDICINE

## 2025-01-15 PROCEDURE — 3078F DIAST BP <80 MM HG: CPT | Performed by: INTERNAL MEDICINE

## 2025-01-15 PROCEDURE — 3075F SYST BP GE 130 - 139MM HG: CPT | Performed by: INTERNAL MEDICINE

## 2025-01-15 RX ORDER — SIMVASTATIN 20 MG
20 TABLET ORAL NIGHTLY
Qty: 90 TABLET | Refills: 3 | Status: SHIPPED | OUTPATIENT
Start: 2025-01-15

## 2025-01-15 RX ORDER — LISINOPRIL 40 MG/1
40 TABLET ORAL DAILY
Qty: 90 TABLET | Refills: 3 | Status: SHIPPED | OUTPATIENT
Start: 2025-01-15

## 2025-01-15 RX ORDER — DIAZEPAM 2 MG/1
2 TABLET ORAL EVERY 12 HOURS PRN
Qty: 60 TABLET | Refills: 1 | Status: SHIPPED | OUTPATIENT
Start: 2025-01-15

## 2025-01-15 NOTE — PROGRESS NOTES
Chief Complaint  Primary Care Follow-Up (On Chol)    Subjective        Abigail Hines presents to [facility] PRIMARY CARE  Primary Care Follow-Up       History of Present Illness  The patient is a 64-year-old female who presents for follow-up and maintenance of hypertension, type 2 diabetes, and hyperlipidemia.    She reports a decrease in her A1c levels and has experienced weight loss, currently weighing 145 pounds. She does not report any adverse effects from metformin and is considering reducing the dosage to 1500 mg. She reports no numbness, burning sensation, tingling, or swelling in her feet. However, she notes the presence of red spots on two of her toes, which appear after prolonged standing.    She is on lisinopril 40 mg for her blood pressure management, which she takes at night. She experiences occasional dizziness upon standing to use the bathroom at night. She inquires about the potential benefits of beta blockers in preventing damage from high blood pressure.    She is on simvastatin for her cholesterol management.    She has not received her influenza vaccine this year and declines it. She has not received the shingles vaccine and declines it. She received a tetanus vaccine approximately 5 years ago. She declines the pneumonia vaccine. Her last ophthalmology appointment was on 10/14/2024. She has not received the new COVID-19 vaccine and declines it. She is due for colon cancer screening in 2027. Her mammogram and Pap smear are up to date.    She reports that her anxiety is well-managed. She is seeking a refill of her Valium prescription, which was previously filled in 2023 with a quantity of 60 tablets. She avoids driving while under the influence of Valium.    FAMILY HISTORY  Her mother has Alzheimer's disease and has had uncontrolled blood pressure for a long time. Her uncle had bladder cancer since he was 50 and is now close to 80.    MEDICATIONS  Current: Lisinopril,  "metformin, simvastatin, Valium    IMMUNIZATIONS  She declined the influenza vaccine, shingles vaccine, pneumonia vaccine, and the new COVID-19 vaccine. She received a tetanus vaccine approximately 5 years ago.      Objective   Vital Signs:  /74   Pulse 69   Temp 98.1 °F (36.7 °C) (Oral)   Resp 16   Ht 152.4 cm (60\")   Wt 65.8 kg (145 lb)   SpO2 99%   BMI 28.32 kg/m²   Estimated body mass index is 28.32 kg/m² as calculated from the following:    Height as of this encounter: 152.4 cm (60\").    Weight as of this encounter: 65.8 kg (145 lb).    BMI is >= 25 and <30. (Overweight) The following options were offered after discussion;: exercise counseling/recommendations and nutrition counseling/recommendations      Past Medical History:   Diagnosis Date    Allergies     Breast nodule 2002    Had calcium deposits on her first MG    Depression     Diabetes mellitus 2007    Type 2 DM    Encounter for screening for malignant neoplasm of colon 05/06/2024    Fibrocystic breast disease (FCBD)     H. pylori infection 10/2006    History of Papanicolaou smear of cervix 2014     Never had an abnormal Pap smear.   Pap smear Hx:  [id number], normal yearly Paps.  2014, neg pap smear, neg HR-HPV.     Hyperlipidemia     Never had high cholesterol, started simvastatin because of Diabetes.    Hypertension     Kidney stones     Intermittantly for the last 10 years, but none for last couple of years.    Menopausal state     Osteopenia     PCO (polycystic ovaries)     Retinal detachment 2010    Twice, 2008 & 2010.    Sciatic nerve pain, left         Family History   Problem Relation Age of Onset    Breast cancer Mother 55        X2 ( 2 SEPARATE CANCERS).    Hypertension Mother     Dementia Father         RAPID ONSET AND VERY PROGRESSIVE.    Hyperlipidemia Father     No Known Problems Sister     Other (FCBD) Sister     No Known Problems Brother     Osteoporosis Maternal Grandmother     Heart disease Maternal Grandfather     Breast " cancer Paternal Grandmother 90          OF OLD AGE    Lung cancer Paternal Grandfather     Anxiety disorder Son     Osteoporosis Maternal Aunt     Breast cancer Maternal Aunt 55    Breast cancer Paternal Aunt     BRCA 1/2 Neg Hx     Colon cancer Neg Hx     Endometrial cancer Neg Hx     Ovarian cancer Neg Hx         Social History     Socioeconomic History    Marital status: Significant Other    Number of children: 1   Tobacco Use    Smoking status: Never    Smokeless tobacco: Never   Vaping Use    Vaping status: Never Used   Substance and Sexual Activity    Alcohol use: Yes     Comment: RARELY/SOCIAL    Drug use: Never    Sexual activity: Yes     Partners: Male     Birth control/protection: Post-menopausal, Surgical     Comment: Partner - JAYLEN         Review of Systems     Physical Exam  Vitals reviewed.   Constitutional:       Appearance: Normal appearance.   HENT:      Head: Normocephalic and atraumatic.   Eyes:      Extraocular Movements: Extraocular movements intact.      Conjunctiva/sclera: Conjunctivae normal.      Pupils: Pupils are equal, round, and reactive to light.   Cardiovascular:      Rate and Rhythm: Normal rate and regular rhythm.      Pulses: Normal pulses.      Heart sounds: Normal heart sounds.   Pulmonary:      Effort: Pulmonary effort is normal.      Breath sounds: Normal breath sounds.   Abdominal:      General: Bowel sounds are normal.      Palpations: Abdomen is soft.   Musculoskeletal:         General: Normal range of motion.      Cervical back: Normal range of motion and neck supple.   Feet:      Right foot:      Protective Sensation: 3 sites tested.  3 sites sensed.      Skin integrity: Skin integrity normal.      Toenail Condition: Right toenails are normal.      Left foot:      Protective Sensation: 3 sites tested.  3 sites sensed.      Skin integrity: Skin integrity normal.      Toenail Condition: Left toenails are normal.   Skin:     General: Skin is warm and dry.   Neurological:       General: No focal deficit present.      Mental Status: She is alert. Mental status is at baseline.   Psychiatric:         Mood and Affect: Mood normal.         Behavior: Behavior normal.         Thought Content: Thought content normal.     Diabetic foot exam patient has normal vibratory sense 2+ pedal pulses present.    Result Review :    Common labs          5/6/2024    08:06 9/4/2024    08:02 1/10/2025    08:34   Common Labs   Glucose 104  95  86    BUN 8  6  5    Creatinine 0.73  0.66  0.72    Sodium 134  134  131    Potassium 4.1  4.3  4.1    Chloride 96  95  94    Calcium 9.2  9.4  9.1    Albumin 4.2  3.9  3.8    Total Bilirubin 0.6  0.6  0.5    Alkaline Phosphatase 51  53  54    AST (SGOT) 21  27  27    ALT (SGPT) 28  35  30    Total Cholesterol 111  119  128    Triglycerides 72  78  134    HDL Cholesterol 55  51  54    LDL Cholesterol  41  52  51    Hemoglobin A1C 5.80  5.80  5.50                Assessment and Plan   Diagnoses and all orders for this visit:    1. Type 2 diabetes mellitus with hyperglycemia, without long-term current use of insulin (Primary)  -     metFORMIN (GLUCOPHAGE) 500 MG tablet; 2 with breakfast  and 1 with dinner.  Dispense: 270 tablet; Refill: 3  -     Hemoglobin A1c; Future  -     Microalbumin / Creatinine Urine Ratio - Urine, Clean Catch; Future    2. Primary hypertension  -     lisinopril (PRINIVIL,ZESTRIL) 40 MG tablet; Take 1 tablet by mouth Daily.  Dispense: 90 tablet; Refill: 3  -     Comprehensive Metabolic Panel; Future  -     Urinalysis With Microscopic - Urine, Clean Catch; Future    3. Chronic anxiety  -     diazePAM (VALIUM) 2 MG tablet; Take 1 tablet by mouth Every 12 (Twelve) Hours As Needed for Anxiety.  Dispense: 60 tablet; Refill: 1    4. Dyslipidemia  -     simvastatin (Zocor) 20 MG tablet; Take 1 tablet by mouth Every Night.  Dispense: 90 tablet; Refill: 3  -     Lipid Panel; Future    5. Vitamin D deficiency  -     Vitamin D 25 Hydroxy; Future    6.  Healthcare maintenance  -     Hepatitis C antibody; Future        Assessment & Plan  1. Hypertension.  Her blood pressure readings are within the normal range. She will continue her current regimen of lisinopril 40 mg.    2. Type 2 diabetes mellitus.  Her blood glucose levels are well-controlled, with an A1c of 5.5, down from 6.7 a year ago. She will adjust her metformin dosage to 500 mg, taking 2 tablets in the morning and 1 tablet in the evening.    3. Hyperlipidemia.  Her cholesterol levels are within the normal range. She will continue her current regimen of simvastatin.    4. Health maintenance.  Her mammogram and Pap smear are up to date. She declined the influenza, shingles, pneumonia, and COVID-19 vaccines. Her next colon cancer screening is due in 2027. A Medicare wellness visit will be scheduled during her next appointment. Hepatitis C screening will be done at her next blood draw.    5. Anxiety.  A prescription for Valium 5 mg, quantity 60, will be sent to [facility].    Follow-up  The patient will follow up in 4 months.            Follow Up   Return in about 4 months (around 5/15/2025) for Medicare Wellness.  Patient was given instructions and counseling regarding her condition or for health maintenance advice. Please see specific information pulled into the AVS if appropriate.           Patient or patient representative verbalized consent for the use of Ambient Listening during the visit with  Kingsley Hines MD for chart documentation. 1/15/2025  10:54 EST

## 2025-01-17 ENCOUNTER — TELEPHONE (OUTPATIENT)
Dept: INTERNAL MEDICINE | Facility: CLINIC | Age: 65
End: 2025-01-17
Payer: MEDICARE

## 2025-01-17 DIAGNOSIS — E78.5 DYSLIPIDEMIA: ICD-10-CM

## 2025-01-17 DIAGNOSIS — E11.65 TYPE 2 DIABETES MELLITUS WITH HYPERGLYCEMIA, WITHOUT LONG-TERM CURRENT USE OF INSULIN: ICD-10-CM
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
## 2025-01-17 DIAGNOSIS — S60.811A CAT SCRATCH OF RIGHT WRIST, INITIAL ENCOUNTER: ICD-10-CM

## 2025-01-17 DIAGNOSIS — I10 PRIMARY HYPERTENSION: ICD-10-CM

## 2025-01-17 RX ORDER — LISINOPRIL 40 MG/1
40 TABLET ORAL DAILY
Qty: 90 TABLET | Refills: 3 | Status: CANCELLED | OUTPATIENT
Start: 2025-01-17

## 2025-01-17 RX ORDER — SIMVASTATIN 20 MG
20 TABLET ORAL NIGHTLY
Qty: 90 TABLET | Refills: 3 | Status: CANCELLED | OUTPATIENT
Start: 2025-01-17

## 2025-01-17 NOTE — TELEPHONE ENCOUNTER
Caller: Abigail Hines    Relationship: Self    Best call back number: [phone]    Requested Prescriptions:   Requested Prescriptions     Pending Prescriptions Disp Refills    lisinopril (PRINIVIL,ZESTRIL) 40 MG tablet 90 tablet 3     Sig: Take 1 tablet by mouth Daily.    metFORMIN (GLUCOPHAGE) 500 MG tablet 270 tablet 3     Si with breakfast  and 1 with dinner.    simvastatin (Zocor) 20 MG tablet 90 tablet 3     Sig: Take 1 tablet by mouth Every Night.        Pharmacy where request should be sent: [facility] [address] [phone] [facility] [phone]      Last office visit with prescribing clinician: 1/15/2025   Last telemedicine visit with prescribing clinician: Visit date not found   Next office visit with prescribing clinician: 5/15/2025     Additional details provided by patient: APPROX FOUR WEEKS    Does the patient have less than a 3 day supply:  [] Yes  [] No    Would you like a call back once the refill request has been completed: [] Yes [x] No    If the office needs to give you a call back, can they leave a voicemail: [] Yes [x] No    Kia Wayne Rep   25 09:30 EST

## 2025-06-03 ENCOUNTER — LAB (OUTPATIENT)
Dept: LAB | Facility: HOSPITAL | Age: 65
End: 2025-06-03
Payer: MEDICARE

## 2025-06-03 PROCEDURE — 80053 COMPREHEN METABOLIC PANEL: CPT | Performed by: INTERNAL MEDICINE

## 2025-06-03 PROCEDURE — 86803 HEPATITIS C AB TEST: CPT | Performed by: INTERNAL MEDICINE

## 2025-06-03 PROCEDURE — 82043 UR ALBUMIN QUANTITATIVE: CPT | Performed by: INTERNAL MEDICINE

## 2025-06-03 PROCEDURE — 80061 LIPID PANEL: CPT | Performed by: INTERNAL MEDICINE

## 2025-06-03 PROCEDURE — 83036 HEMOGLOBIN GLYCOSYLATED A1C: CPT | Performed by: INTERNAL MEDICINE

## 2025-06-03 PROCEDURE — 82570 ASSAY OF URINE CREATININE: CPT | Performed by: INTERNAL MEDICINE

## 2025-06-03 PROCEDURE — 82306 VITAMIN D 25 HYDROXY: CPT | Performed by: INTERNAL MEDICINE

## 2025-06-09 ENCOUNTER — OFFICE VISIT (OUTPATIENT)
Dept: INTERNAL MEDICINE | Facility: CLINIC | Age: 65
End: 2025-06-09
Payer: MEDICARE

## 2025-06-09 VITALS
HEIGHT: 60 IN | BODY MASS INDEX: 29.02 KG/M2 | SYSTOLIC BLOOD PRESSURE: 124 MMHG | OXYGEN SATURATION: 99 % | TEMPERATURE: 98.3 F | WEIGHT: 147.8 LBS | DIASTOLIC BLOOD PRESSURE: 62 MMHG | HEART RATE: 72 BPM

## 2025-06-09 DIAGNOSIS — F41.9 CHRONIC ANXIETY: ICD-10-CM

## 2025-06-09 DIAGNOSIS — I10 PRIMARY HYPERTENSION: Primary | ICD-10-CM

## 2025-06-09 DIAGNOSIS — E78.5 DYSLIPIDEMIA: ICD-10-CM

## 2025-06-09 DIAGNOSIS — E11.65 TYPE 2 DIABETES MELLITUS WITH HYPERGLYCEMIA, WITHOUT LONG-TERM CURRENT USE OF INSULIN: ICD-10-CM

## 2025-06-09 DIAGNOSIS — Z00.00 WELLNESS EXAMINATION: ICD-10-CM

## 2025-06-09 DIAGNOSIS — Z78.0 POSTMENOPAUSE: ICD-10-CM

## 2025-06-09 PROBLEM — L71.8 OTHER ROSACEA: Status: ACTIVE | Noted: 2018-04-02

## 2025-06-09 PROCEDURE — 3078F DIAST BP <80 MM HG: CPT | Performed by: INTERNAL MEDICINE

## 2025-06-09 PROCEDURE — 1126F AMNT PAIN NOTED NONE PRSNT: CPT | Performed by: INTERNAL MEDICINE

## 2025-06-09 PROCEDURE — 3044F HG A1C LEVEL LT 7.0%: CPT | Performed by: INTERNAL MEDICINE

## 2025-06-09 PROCEDURE — 99214 OFFICE O/P EST MOD 30 MIN: CPT | Performed by: INTERNAL MEDICINE

## 2025-06-09 PROCEDURE — 3074F SYST BP LT 130 MM HG: CPT | Performed by: INTERNAL MEDICINE

## 2025-06-09 PROCEDURE — G2211 COMPLEX E/M VISIT ADD ON: HCPCS | Performed by: INTERNAL MEDICINE

## 2025-06-09 RX ORDER — DIAZEPAM 2 MG/1
2 TABLET ORAL EVERY 12 HOURS PRN
Qty: 60 TABLET | Refills: 1 | Status: SHIPPED | OUTPATIENT
Start: 2025-06-09

## 2025-06-09 RX ORDER — ROSUVASTATIN CALCIUM 5 MG/1
5 TABLET, COATED ORAL DAILY
Qty: 90 TABLET | Refills: 3 | Status: SHIPPED | OUTPATIENT
Start: 2025-06-09

## 2025-06-09 NOTE — PROGRESS NOTES
"Chief Complaint  Diabetes    Subjective        Abigail Hines presents to [facility] PRIMARY CARE  Diabetes       History of Present Illness  The patient is a 65-year-old female who presents for follow-up of type 2 diabetes, hyperlipidemia, and depression.    She has discontinued her cholesterol medication, simvastatin, due to an increase in aches and pains, as well as abnormal liver enzyme levels. She reports that the pain has since subsided. She has been on simvastatin for several years, which she notes has significantly reduced her cholesterol levels.    She reports no neuropathy and recently had a consultation with her retinal doctor last week.    She is not currently taking Valium frequently but reports that it effectively manages her anxiety when used. She does not experience any side effects from the medication. She typically takes 2 tablets of Valium, but occasionally increases the dose to 4 tablets.    She has not received the shingles vaccine and is uncertain about her future plans regarding this. Her mammogram and bone density scan are scheduled for 10/2025. She continues to see her gynecologist annually.    PAST SURGICAL HISTORY:  She underwent surgery for a ruptured biceps tendon weeks ago on a Friday.    FAMILY HISTORY  Her mother has osteoporosis.      Objective   Vital Signs:  /62 (BP Location: Right arm, Patient Position: Sitting)   Pulse 72   Temp 98.3 °F (36.8 °C) (Oral)   Ht 152.4 cm (60\")   Wt 67 kg (147 lb 12.8 oz)   SpO2 99%   BMI 28.87 kg/m²   Estimated body mass index is 28.87 kg/m² as calculated from the following:    Height as of this encounter: 152.4 cm (60\").    Weight as of this encounter: 67 kg (147 lb 12.8 oz).            Past Medical History:   Diagnosis Date    Allergies     Breast nodule 2002    Had calcium deposits on her first MG    Depression     Diabetes mellitus 2007    Type 2 DM    Encounter for screening for malignant neoplasm of colon " 2024    Fibrocystic breast disease (FCBD)     H. pylori infection 10/2006    History of Papanicolaou smear of cervix      Never had an abnormal Pap smear.   Pap smear Hx:  [id number], normal yearly Paps.  , neg pap smear, neg HR-HPV.     Hyperlipidemia     Never had high cholesterol, started simvastatin because of Diabetes.    Hypertension     Kidney stones     Intermittantly for the last 10 years, but none for last couple of years.    Menopausal state     Osteopenia     PCO (polycystic ovaries)     Retinal detachment     Twice, 2008 & .    Sciatic nerve pain, left         Family History   Problem Relation Age of Onset    Breast cancer Mother 55        X2 ( 2 SEPARATE CANCERS).    Hypertension Mother     Dementia Father         RAPID ONSET AND VERY PROGRESSIVE.    Hyperlipidemia Father     No Known Problems Sister     Other (FCBD) Sister     No Known Problems Brother     Osteoporosis Maternal Grandmother     Heart disease Maternal Grandfather     Breast cancer Paternal Grandmother 90          OF OLD AGE    Lung cancer Paternal Grandfather     Anxiety disorder Son     Osteoporosis Maternal Aunt     Breast cancer Maternal Aunt 55    Breast cancer Paternal Aunt     BRCA 1/2 Neg Hx     Colon cancer Neg Hx     Endometrial cancer Neg Hx     Ovarian cancer Neg Hx         Social History     Socioeconomic History    Marital status: Significant Other    Number of children: 1   Tobacco Use    Smoking status: Never     Passive exposure: Never    Smokeless tobacco: Never   Vaping Use    Vaping status: Never Used   Substance and Sexual Activity    Alcohol use: Not Currently     Comment: RARELY/SOCIAL    Drug use: Never    Sexual activity: Yes     Partners: Male     Birth control/protection: Post-menopausal, Surgical     Comment: Partner - JAYLEN         Review of Systems     Physical Exam  Vitals reviewed.   Constitutional:       Appearance: Normal appearance.   Eyes:      Extraocular Movements: Extraocular  movements intact.      Conjunctiva/sclera: Conjunctivae normal.      Pupils: Pupils are equal, round, and reactive to light.   Cardiovascular:      Rate and Rhythm: Normal rate and regular rhythm.      Pulses: Normal pulses.      Heart sounds: Normal heart sounds.   Pulmonary:      Effort: Pulmonary effort is normal.   Musculoskeletal:      Cervical back: Normal range of motion and neck supple.   Neurological:      General: No focal deficit present.      Mental Status: She is alert. Mental status is at baseline.   Psychiatric:         Mood and Affect: Mood normal.         Behavior: Behavior normal.         Thought Content: Thought content normal.          Result Review :    Common labs          9/4/2024    08:02 1/10/2025    08:34 6/3/2025    08:08 6/3/2025    08:11   Common Labs   Glucose 95  86   92    BUN 6  5   7.0    Creatinine 0.66  0.72   0.65    Sodium 134  131   136    Potassium 4.3  4.1   4.3    Chloride 95  94   97    Calcium 9.4  9.1   9.6    Albumin 3.9  3.8   4.1    Total Bilirubin 0.6  0.5   0.6    Alkaline Phosphatase 53  54   65    AST (SGOT) 27  27   23    ALT (SGPT) 35  30   27    Total Cholesterol 119  128   197    Triglycerides 78  134   119    HDL Cholesterol 51  54   57    LDL Cholesterol  52  51   119    Hemoglobin A1C 5.80  5.50   5.50    Microalbumin, Urine   <1.2                 Assessment and Plan   Diagnoses and all orders for this visit:    1. Primary hypertension (Primary)  -     Comprehensive Metabolic Panel; Future  -     Urinalysis With Microscopic - Urine, Clean Catch; Future    2. Dyslipidemia  -     rosuvastatin (Crestor) 5 MG tablet; Take 1 tablet by mouth Daily.  Dispense: 90 tablet; Refill: 3  -     Lipid Panel With LDL / HDL Ratio; Future    3. Type 2 diabetes mellitus with hyperglycemia, without long-term current use of insulin  -     Hemoglobin A1c; Future    4. Chronic anxiety  -     diazePAM (VALIUM) 2 MG tablet; Take 1 tablet by mouth Every 12 (Twelve) Hours As Needed  for Anxiety.  Dispense: 60 tablet; Refill: 1    5. Postmenopause  -     DEXA Bone Density Axial; Future    6. Wellness examination  -     CBC & Differential; Future  -     TSH; Future  -     Vitamin B12; Future        Assessment & Plan  1. Hyperlipidemia.  - LDL level is currently at 119, with a target goal of 70 or less.  - Liver enzymes are back to normal.  - Prescription for rosuvastatin 5 mg has been issued.  - Plan to reassess effectiveness in 3 months.    2. Type 2 diabetes mellitus.  - Diabetes is well-managed, as evidenced by A1c levels and microalbumin/creatinine ratio.  - No neuropathy reported.  - Continue current regimen of metformin.  - Recent retinal exam was conducted last week.    3. Depression.  - Valium helps with anxiety without significant side effects.  - No reported issues with medication tolerance.  - Refill for Valium has been provided.  - PDMP checked and is as expected.    4. Health maintenance.  - Has not received the shingles vaccine and is uncertain about future plans regarding this.  - Mammogram and bone density scan are scheduled for October 2025.  - Order for bone density scan has been placed to ensure it is up to date.    Follow-up  The patient will follow up in 4 months for a wellness exam.            Follow Up   Return in about 4 months (around 10/9/2025) for Medicare Wellness.  Patient was given instructions and counseling regarding her condition or for health maintenance advice. Please see specific information pulled into the AVS if appropriate.           Patient or patient representative verbalized consent for the use of Ambient Listening during the visit with  Kingsley Hines MD for chart documentation. 6/9/2025  16:28 EDT

## 2025-07-12 ENCOUNTER — HOSPITAL ENCOUNTER (OUTPATIENT)
Dept: BONE DENSITY | Facility: HOSPITAL | Age: 65
Discharge: HOME | End: 2025-07-12
Payer: MEDICARE

## 2025-07-12 DIAGNOSIS — Z78.0 POSTMENOPAUSE: ICD-10-CM

## 2025-07-12 PROCEDURE — 77080 DXA BONE DENSITY AXIAL: CPT
